# Patient Record
Sex: FEMALE | Race: WHITE | HISPANIC OR LATINO | Employment: PART TIME | URBAN - METROPOLITAN AREA
[De-identification: names, ages, dates, MRNs, and addresses within clinical notes are randomized per-mention and may not be internally consistent; named-entity substitution may affect disease eponyms.]

---

## 2018-09-05 ENCOUNTER — OFFICE VISIT (OUTPATIENT)
Dept: URGENT CARE | Facility: PHYSICIAN GROUP | Age: 18
End: 2018-09-05
Payer: COMMERCIAL

## 2018-09-05 VITALS
OXYGEN SATURATION: 100 % | HEART RATE: 73 BPM | DIASTOLIC BLOOD PRESSURE: 74 MMHG | BODY MASS INDEX: 23.19 KG/M2 | TEMPERATURE: 97.3 F | RESPIRATION RATE: 14 BRPM | WEIGHT: 126 LBS | HEIGHT: 62 IN | SYSTOLIC BLOOD PRESSURE: 108 MMHG

## 2018-09-05 DIAGNOSIS — R10.13 EPIGASTRIC PAIN: ICD-10-CM

## 2018-09-05 PROCEDURE — 99214 OFFICE O/P EST MOD 30 MIN: CPT | Performed by: PHYSICIAN ASSISTANT

## 2018-09-05 RX ORDER — OMEPRAZOLE 20 MG/1
20 CAPSULE, DELAYED RELEASE ORAL DAILY
Qty: 14 CAP | Refills: 0 | Status: SHIPPED | OUTPATIENT
Start: 2018-09-05 | End: 2018-09-19

## 2018-09-05 RX ORDER — SUCRALFATE 1 G/1
1 TABLET ORAL
Qty: 56 TAB | Refills: 0 | Status: SHIPPED | OUTPATIENT
Start: 2018-09-05 | End: 2018-09-19

## 2018-09-06 ASSESSMENT — ENCOUNTER SYMPTOMS
CONSTIPATION: 0
COUGH: 0
VOMITING: 0
VISUAL CHANGE: 0
FLANK PAIN: 0
EYE DISCHARGE: 0
FATIGUE: 1
FEVER: 0
CHILLS: 0
WHEEZING: 0
BLOOD IN STOOL: 0
HEARTBURN: 1
SORE THROAT: 0
SHORTNESS OF BREATH: 0
ROS GI COMMENTS: 1
NAUSEA: 1
DIARRHEA: 0
EYE REDNESS: 0
ABDOMINAL PAIN: 1

## 2018-09-07 NOTE — PROGRESS NOTES
"Subjective:      Mia Duran is a 18 y.o. female who presents with GI Problem (uppper stomach pain nausea since  sunday after eating )            Patient is a 18-year-old female who presents today with upper abdominal pain with associated nausea for the last days.  Patient reports the pain is most notable after eating.  Patient denies any associated diarrhea, fevers, vomiting only nausea.  Patient currently denies any urinary symptoms, vaginal discharge, rashes or blood in her stool.  Patient denies any prior abdominal surgeries      GI Problem   This is a new problem. The current episode started in the past 7 days. The problem occurs constantly. The problem has been waxing and waning. Associated symptoms include abdominal pain, fatigue and nausea. Pertinent negatives include no chills, congestion, coughing, fever, rash, sore throat, urinary symptoms, visual change or vomiting. The symptoms are aggravated by eating. She has tried nothing for the symptoms.       Review of Systems   Constitutional: Positive for fatigue. Negative for chills, fever and malaise/fatigue.   HENT: Negative for congestion and sore throat.    Eyes: Negative for discharge and redness.   Respiratory: Negative for cough, shortness of breath and wheezing.    Gastrointestinal: Positive for abdominal pain, heartburn and nausea. Negative for blood in stool, constipation, diarrhea, melena and vomiting.        1   Genitourinary: Negative for dysuria, flank pain, frequency, hematuria and urgency.   Skin: Negative for itching and rash.   All other systems reviewed and are negative.         Objective:     /74   Pulse 73   Temp 36.3 °C (97.3 °F)   Resp 14   Ht 1.575 m (5' 2\")   Wt 57.2 kg (126 lb)   SpO2 100%   BMI 23.05 kg/m²    Last normal menstrual cycle was 2 weeks ago  Physical Exam   Constitutional: She is oriented to person, place, and time. She appears well-developed and well-nourished. No distress.   HENT:   Head: Normocephalic " and atraumatic.   Right Ear: External ear normal.   Left Ear: External ear normal.   Mouth/Throat: Oropharynx is clear and moist. No oropharyngeal exudate.   Eyes: Pupils are equal, round, and reactive to light. Conjunctivae and EOM are normal.   Neck: Normal range of motion. Neck supple. No tracheal deviation present.   Cardiovascular: Normal rate and regular rhythm.    No murmur heard.  Pulmonary/Chest: Effort normal and breath sounds normal. No respiratory distress.   Abdominal: Soft. Bowel sounds are normal. There is tenderness in the epigastric area.       Patient with notable tenderness to the epigastrium, without specific Zhao sign, and specifically negative McBurney's.   Musculoskeletal: Normal range of motion. She exhibits no edema.   Neurological: She is alert and oriented to person, place, and time. Coordination normal.   Skin: Skin is warm. No rash noted.   Psychiatric: She has a normal mood and affect. Her behavior is normal. Judgment and thought content normal.   Vitals reviewed.              Assessment/Plan:     1. Epigastric pain  - hyoscyamine-maalox plus-lidocaine viscous (GI COCKTAIL); Take 30 mL by mouth Once for 1 dose.  Dispense: 30 mL; Refill: 0  - sucralfate (CARAFATE) 1 GM Tab; Take 1 Tab by mouth 4 Times a Day,Before Meals and at Bedtime for 14 days.  Dispense: 56 Tab; Refill: 0  - omeprazole (PRILOSEC) 20 MG delayed-release capsule; Take 1 Cap by mouth every day for 14 days.  Dispense: 14 Cap; Refill: 0    Recheck after GI cocktail of which the patient had complete resolvent of her symptoms.  Patient declined further referrals, imaging and/or labs at this time as she feels great after the cocktail.  I will treat for suspected gastritis-ulceration.  Strongly encouraged the patient to have follow-up with her PCP as patient may need further labs to rule out H pylori and or ultrasound to ensure patient is not with gallbladder issues.  Diet changes discussed.   Patient given precautionary  s/sx that mandate immediate follow up and evaluation in the ED. Advised of risks of not doing so.    DDX, Supportive care, and indications for immediate follow-up discussed with patient.    Instructed to return to clinic or nearest emergency department if we are not available for any change in condition, further concerns, or worsening of symptoms.    The patient demonstrated a good understanding and agreed with the treatment plan.    Please note that this dictation was created using voice recognition software. I have made every reasonable attempt to correct obvious errors, but I expect that there are errors of grammar and possibly content that I did not discover before finalizing the note.

## 2018-10-29 ENCOUNTER — OFFICE VISIT (OUTPATIENT)
Dept: URGENT CARE | Facility: PHYSICIAN GROUP | Age: 18
End: 2018-10-29

## 2018-10-29 VITALS
TEMPERATURE: 98.1 F | HEIGHT: 62 IN | RESPIRATION RATE: 16 BRPM | BODY MASS INDEX: 24.29 KG/M2 | DIASTOLIC BLOOD PRESSURE: 56 MMHG | SYSTOLIC BLOOD PRESSURE: 104 MMHG | HEART RATE: 84 BPM | WEIGHT: 132 LBS | OXYGEN SATURATION: 99 %

## 2018-10-29 DIAGNOSIS — S39.012A STRAIN OF LUMBAR REGION, INITIAL ENCOUNTER: ICD-10-CM

## 2018-10-29 DIAGNOSIS — S86.811A STRAIN OF CALF MUSCLE, RIGHT, INITIAL ENCOUNTER: ICD-10-CM

## 2018-10-29 PROCEDURE — 99214 OFFICE O/P EST MOD 30 MIN: CPT | Performed by: FAMILY MEDICINE

## 2018-10-29 RX ORDER — IBUPROFEN 200 MG
200 TABLET ORAL EVERY 6 HOURS PRN
COMMUNITY
End: 2019-10-04

## 2018-10-29 RX ORDER — CYCLOBENZAPRINE HCL 5 MG
5-10 TABLET ORAL 3 TIMES DAILY PRN
Qty: 30 TAB | Refills: 0 | Status: SHIPPED | OUTPATIENT
Start: 2018-10-29 | End: 2019-10-04

## 2018-10-29 NOTE — LETTER
October 29, 2018         Patient: Mia Duran   YOB: 2000   Date of Visit: 10/29/2018           To Whom it May Concern:    Mia Duran was seen in my clinic on 10/29/2018. She may return to work on Thursday..    If you have any questions or concerns, please don't hesitate to call.        Sincerely,           Sebastian Ponce M.D.  Electronically Signed

## 2018-10-30 NOTE — PROGRESS NOTES
"  Chief Complaint   Patient presents with   • Leg Pain     onset friday after workout, lower back pain             Back Pain  This is a new problem. The current episode started in the past 2 days after doing \"squats\" at the gym. The problem occurs constantly. The problem is unchanged. The pain is present in the lumbar spine. The quality of the pain is described as aching.   She also has some pain in rt calf muscle. The pain does not radiate. The pain is moderate. The symptoms are aggravated by position.   States it is painful to walk, especially striking with the rt foot.       Pertinent negatives include no bladder incontinence, bowel incontinence, dysuria, fever, headaches, numbness or weakness. Treatments tried: motrin.  The treatment provided no relief.     Social History   Substance Use Topics   • Smoking status: Never Smoker   • Smokeless tobacco: Never Used   • Alcohol use No       Family hx was reviewed - no pertinent past family hx      Past medical history was unremarkable and not pertinent to current issue    Review of Systems   Constitutional: Negative for fever.   Gastrointestinal: Negative for bowel incontinence.   Genitourinary: Negative for bladder incontinence, dysuria, urgency and frequency.   Musculoskeletal: Positive for back pain.   Neurological: Negative for weakness, numbness and headaches.   All other systems reviewed and are negative.         Objective:     Blood pressure 104/56, pulse 84, temperature 36.7 °C (98.1 °F), temperature source Temporal, resp. rate 16, height 1.575 m (5' 2\"), weight 59.9 kg (132 lb), SpO2 99 %.    Physical Exam   Constitutional: pt is oriented to person, place, and time. Pt appears well-developed and well-nourished. No distress.   HENT:   Head: Normocephalic and atraumatic.   Eyes: EOM are normal. Pupils are equal, round, and reactive to light. No scleral icterus.   Neck: Normal range of motion. Neck supple. No thyromegaly present.   Cardiovascular: Normal rate, " regular rhythm and normal heart sounds.    Pulmonary/Chest: Effort normal and breath sounds normal. No respiratory distress.  no wheezes.   no rales.  no tenderness.   Musculoskeletal: pt exhibits no edema.        Right knee: Normal.        Left knee: Normal.               Lumbar back: pt exhibits decreased range of motion, spasm and tenderness . Pt exhibits no bony tenderness, no swelling, no edema, no deformity  .   Neurological: patient is alert and oriented to person, place, and time. Patient has normal reflexes. No cranial nerve deficit. Patient exhibits normal muscle tone.      STRAIGHT LEG RAISE, BRANT NEGATIVE BILAT      Rt calf - no edemea.   +TTP over belly of calf muscle.   phuong's sx neg  Skin: Skin is warm and dry. No rash noted. No erythema.   Psychiatric: patient has a normal mood and affect.  behavior is normal.   Nursing note and vitals reviewed.              Assessment/Plan:       1. Strain of lumbar region, initial encounter     - cyclobenzaprine (FLEXERIL) 5 MG tablet; Take 1-2 Tabs by mouth 3 times a day as needed.  Dispense: 30 Tab; Refill: 0  - Diclofenac Sodium (VOLTAREN) 1 % Gel; Apply 4 g to skin as directed 3 times a day as needed.  Dispense: 1 Tube; Refill: 0    2. Strain of calf muscle, right, initial encounter     - Diclofenac Sodium (VOLTAREN) 1 % Gel; Apply 4 g to skin as directed 3 times a day as needed.  Dispense: 1 Tube; Refill: 0       Crutches      Follow up in one week if no improvement, sooner if symptoms worsen.

## 2019-05-03 ENCOUNTER — NON-PROVIDER VISIT (OUTPATIENT)
Dept: URGENT CARE | Facility: PHYSICIAN GROUP | Age: 19
End: 2019-05-03

## 2019-05-03 DIAGNOSIS — Z11.1 SCREENING FOR TUBERCULOSIS: ICD-10-CM

## 2019-05-03 PROCEDURE — 86580 TB INTRADERMAL TEST: CPT | Performed by: PHYSICIAN ASSISTANT

## 2019-05-03 NOTE — PROGRESS NOTES
Mia Duran is a 19 y.o. female here for a non-provider visit for PPD placement -- Step 1 of 2    Reason for PPD:  school requirement    1. TB evaluation questionnaire completed by patient? Yes      -  If any answers marked yes did you contact a provider prior to placing? Not Indicated  2.  Patient notified to return to clinic for reading on:   5/5/2019 after 10:37a  5/6/2019 before 10:37a  3.  PPD Placement documentation completed on TB evaluation questionnaire? Yes  4.  Location of TB evaluation questionnaire filed: media

## 2019-05-05 ENCOUNTER — NON-PROVIDER VISIT (OUTPATIENT)
Dept: URGENT CARE | Facility: PHYSICIAN GROUP | Age: 19
End: 2019-05-05

## 2019-05-05 LAB — TB WHEAL 3D P 5 TU DIAM: NORMAL MM

## 2019-05-05 NOTE — PROGRESS NOTES
Mia Duran is a 19 y.o. female here for a non-provider visit for PPD reading -- Step 1 of 2.      1.  Resulted in Epic under enter/edit results? Yes   2.  TB evaluation questionnaire scanned into chart and original given to patient?Yes      3. Was induration greater than 0 mm? No.    If Step 1 of 2, when is patient returning for second step (delete if N/A):      Routed to PCP     (2) potential problem

## 2019-05-10 ENCOUNTER — NON-PROVIDER VISIT (OUTPATIENT)
Dept: URGENT CARE | Facility: PHYSICIAN GROUP | Age: 19
End: 2019-05-10

## 2019-05-10 DIAGNOSIS — Z11.1 ENCOUNTER FOR PPD TEST: Primary | ICD-10-CM

## 2019-05-10 NOTE — PROGRESS NOTES
Mia Duran is a 19 y.o. female here for a non-provider visit for PPD placement -- Step 2 of 2    Reason for PPD:  school requirement    1. TB evaluation questionnaire completed by patient? Yes      -  If any answers marked yes did you contact a provider prior to placing? Not Indicated  2.  Patient notified to return to clinic for reading on: 5/12 or 5/13  3.  PPD Placement documentation completed on TB evaluation questionnaire? Yes  4.  Location of TB evaluation questionnaire filed:

## 2019-05-13 ENCOUNTER — NON-PROVIDER VISIT (OUTPATIENT)
Dept: URGENT CARE | Facility: PHYSICIAN GROUP | Age: 19
End: 2019-05-13

## 2019-05-13 LAB — TB WHEAL 3D P 5 TU DIAM: 0 MM

## 2019-05-13 PROCEDURE — 86580 TB INTRADERMAL TEST: CPT | Performed by: FAMILY MEDICINE

## 2019-05-13 NOTE — PROGRESS NOTES
Mia Duran is a 19 y.o. female here for a non-provider visit for PPD reading -- Step 2 of 2.      1.  Resulted in Epic under enter/edit results? Yes   2.  TB evaluation questionnaire scanned into chart and original given to patient?Yes      3. Was induration greater than 0 mm? No.    Routed to PCP? No

## 2019-06-03 ENCOUNTER — GYNECOLOGY VISIT (OUTPATIENT)
Dept: OBGYN | Facility: CLINIC | Age: 19
End: 2019-06-03

## 2019-06-03 VITALS — SYSTOLIC BLOOD PRESSURE: 100 MMHG | DIASTOLIC BLOOD PRESSURE: 60 MMHG | WEIGHT: 133 LBS | BODY MASS INDEX: 24.33 KG/M2

## 2019-06-03 DIAGNOSIS — N93.8 DYSFUNCTIONAL UTERINE BLEEDING: ICD-10-CM

## 2019-06-03 DIAGNOSIS — Z34.00 SUPERVISION OF NORMAL FIRST PREGNANCY, ANTEPARTUM: ICD-10-CM

## 2019-06-03 LAB
INT CON NEG: NEGATIVE
INT CON POS: POSITIVE
POC URINE PREGNANCY TEST: POSITIVE

## 2019-06-03 PROCEDURE — 81025 URINE PREGNANCY TEST: CPT | Performed by: NURSE PRACTITIONER

## 2019-06-03 PROCEDURE — 99213 OFFICE O/P EST LOW 20 MIN: CPT | Performed by: NURSE PRACTITIONER

## 2019-06-03 NOTE — NON-PROVIDER
Pt here for DUB.    Pt states has been having cramping and nausea   Good# [phone]  LMP: 4/24/19

## 2019-06-03 NOTE — PROGRESS NOTES
GYN DUB visit today    S) patient is a 19 yoa nulligravid with missed menses. States sure LMP of 4/24. Has had positive home UPT. States slight abdominal cramping, no bleeding. No dysuria. States some nausea but no vomit. Has already started a prenatal vitamin. Questioning of FOB and patient reveals no significant medical or genetic history. On maternal side, grandparents with hypertension. On paternal side, grandfather with prostate cancer. Both are happy with pregnancy.   O) positive UPT today  A) IUP at 5 5/7 week gestation based on sure LMP with tentative/initial rafael of 1/29/20.   Patient will make appt in 4-5 weeks for new OB. Will make financial appt as she has no insurance coverage. All questions answered. SAB precautions s/s reviewed. Continue PNV. Anticipate labs and physical at new OB appt.     Patient was seen for 15 minutes face to face of which > 50% of appointment time was spent on counseling and coordination of care regarding the above.

## 2019-07-19 ENCOUNTER — TELEPHONE (OUTPATIENT)
Dept: OBGYN | Facility: CLINIC | Age: 19
End: 2019-07-19

## 2019-07-19 ENCOUNTER — HOSPITAL ENCOUNTER (OUTPATIENT)
Facility: MEDICAL CENTER | Age: 19
End: 2019-07-19
Attending: NURSE PRACTITIONER
Payer: COMMERCIAL

## 2019-07-19 ENCOUNTER — INITIAL PRENATAL (OUTPATIENT)
Dept: OBGYN | Facility: CLINIC | Age: 19
End: 2019-07-19

## 2019-07-19 ENCOUNTER — HOSPITAL ENCOUNTER (OUTPATIENT)
Dept: LAB | Facility: MEDICAL CENTER | Age: 19
End: 2019-07-19
Attending: NURSE PRACTITIONER
Payer: COMMERCIAL

## 2019-07-19 VITALS
BODY MASS INDEX: 24.48 KG/M2 | DIASTOLIC BLOOD PRESSURE: 70 MMHG | HEIGHT: 62 IN | SYSTOLIC BLOOD PRESSURE: 104 MMHG | WEIGHT: 133 LBS

## 2019-07-19 DIAGNOSIS — Z34.02 ENCOUNTER FOR SUPERVISION OF NORMAL FIRST PREGNANCY IN SECOND TRIMESTER: ICD-10-CM

## 2019-07-19 DIAGNOSIS — Z34.02 ENCOUNTER FOR SUPERVISION OF NORMAL FIRST PREGNANCY IN SECOND TRIMESTER: Primary | ICD-10-CM

## 2019-07-19 DIAGNOSIS — Z34.91 PRENATAL CARE IN FIRST TRIMESTER: ICD-10-CM

## 2019-07-19 PROBLEM — Z34.92 ENCOUNTER FOR SUPERVISION OF NORMAL PREGNANCY IN SECOND TRIMESTER: Status: ACTIVE | Noted: 2019-07-19

## 2019-07-19 LAB
ABO GROUP BLD: NORMAL
APPEARANCE UR: NORMAL
BILIRUB UR STRIP-MCNC: NORMAL MG/DL
BLD GP AB SCN SERPL QL: NORMAL
COLOR UR AUTO: NORMAL
GLUCOSE UR STRIP.AUTO-MCNC: NEGATIVE MG/DL
KETONES UR STRIP.AUTO-MCNC: NEGATIVE MG/DL
LEUKOCYTE ESTERASE UR QL STRIP.AUTO: NEGATIVE
NITRITE UR QL STRIP.AUTO: NEGATIVE
PH UR STRIP.AUTO: 7.5 [PH] (ref 5–8)
PROT UR QL STRIP: NEGATIVE MG/DL
RBC UR QL AUTO: NORMAL
RH BLD: NORMAL
SP GR UR STRIP.AUTO: 1.01
UROBILINOGEN UR STRIP-MCNC: NORMAL MG/DL

## 2019-07-19 PROCEDURE — 81002 URINALYSIS NONAUTO W/O SCOPE: CPT | Performed by: NURSE PRACTITIONER

## 2019-07-19 PROCEDURE — 0500F INITIAL PRENATAL CARE VISIT: CPT | Performed by: NURSE PRACTITIONER

## 2019-07-19 ASSESSMENT — ENCOUNTER SYMPTOMS
RESPIRATORY NEGATIVE: 1
PSYCHIATRIC NEGATIVE: 1
GASTROINTESTINAL NEGATIVE: 1
CONSTITUTIONAL NEGATIVE: 1
MUSCULOSKELETAL NEGATIVE: 1
EYES NEGATIVE: 1
NEUROLOGICAL NEGATIVE: 1
CARDIOVASCULAR NEGATIVE: 1

## 2019-07-19 NOTE — PROGRESS NOTES
NOB today  LMP 4/24/19  PNV currently taking   Last pap: none   Phone # [phone]  Pharmacy confirmed  Pt states no concerns today

## 2019-07-19 NOTE — TELEPHONE ENCOUNTER
Pt called stating she had her New Ob appt today but did not get an US as she was told on the previous appt. Adv pt her dates are correct and  usually when unsure of LMP or dates are off. Pt requesting to do , notified it would be $120.00 and pt agrees and would like to schedule one. Call transferred to RadhaPHUC to schedule

## 2019-07-19 NOTE — PROGRESS NOTES
"Subjective:      S:  Mia Beard is a 19 y.o.  female  @ EGA: 12w2d TERRIE: Estimated Date of Delivery: 20  per LMP who presents for her new OB exam.  She has no complaints.  Desires AFP.  Declines CF.  Reports no FM, VB, LOF, or cramping.  Denies dysuria, vaginal DC.  Pt is  and lives with FOB (Lionel). Works as  in [employer], no heavy lifting.  Pregnancy is unplanned but desired.  Desires Centering Pregnancy.    O:    Vitals:    19 0937   BP: 104/70   Weight: 60.3 kg (133 lb)   Height: 1.575 m (5' 2\")    See H&P Prenatal Physical.  Wet mount: not indicated        FHTs: 160        Fundal ht: 12cm     A:   1.  IUP @ 12w2d TERRIE: Estimated Date of Delivery: 20 per LMP         2.  S=D        3.    Patient Active Problem List    Diagnosis Date Noted   • Encounter for supervision of normal pregnancy in second trimester 2019   • Supervision of normal first pregnancy, antepartum 2019   • Dysfunctional uterine bleeding 2019         P:  1.  GC/CT fdone. Pap deferred due to age.         2.  Prenatal labs ordered - lab slip given        3.  Discussed PNV, diet, and adequate water intake        4.  NOB packet given        5.  Return to office in 4 wks        6.  Complete OB US in 7-8 wks        7.  Centering Pregnancy    HPI    Review of Systems   Constitutional: Negative.    HENT: Negative.    Eyes: Negative.    Respiratory: Negative.    Cardiovascular: Negative.    Gastrointestinal: Negative.    Genitourinary: Negative.         Uterus enlarged, c/w 12 wk ga   Musculoskeletal: Negative.    Skin: Negative.    Neurological: Negative.    Endo/Heme/Allergies: Negative.    Psychiatric/Behavioral: Negative.           Objective:     /70   Ht 1.575 m (5' 2\")   Wt 60.3 kg (133 lb)   LMP 2019 (Exact Date)   BMI 24.33 kg/m²      Physical Exam            Assessment/Plan:     1. Encounter for supervision of normal first pregnancy in second " trimester    - PREG CNTR PRENATAL PN; Future  - US-OB 2ND 3RD TRI COMPLETE; Future  - CHLAMYDIA/GC PCR URINE OR SWAB; Future    2. Prenatal care in first trimester    - POCT Urinalysis

## 2019-07-19 NOTE — LETTER
Cystic Fibrosis Carrier Testing  Mia Beard    The following information is about a blood test that can be done to determine if you and/or your partner carry the gene for cystic fibrosis.    WHAT IS CYSTIC FIBROSIS?  · Cystic fibrosis (CF) is an inherited disease that affects more than 25,000 [place] children and young adults.  · Symptoms of CF vary but include lung congestion, pneumonia, diarrhea and poor growth.  Most people with CF have severe medical problems and some die at a young age.  Others have so few symptoms they are unaware they have CF.  · CF does not affect intelligence.  · Although there is no cure for CF at this time, scientists are making progress in improving treatment and in searching for a cure.  In the past many people with CF  at a very young age.  Today, many are living into their 20’s and 30’s.    IS THERE A CHANCE MY BABY COULD HAVE CYSTIC FIBROSIS?  · You can have a child with CF even if there is no history in your family (see chart below).  · CF testing can help determine if you are a carrier and at risk to have a child with CF.  Note: if both parents are carriers, there is a 1 in 4 (25%) chance with each pregnancy that they will have a child with CF.  · Carriers have one normal CF gene and one altered CF gene.  · People with CF have two altered CF genes.  · Most people have two normal copies of the CF gene.    Approximate risk that a couple with no family history of cystic fibrosis will have a child with cystic fibrosis:    Ethnic background / Risk     couple:  1 in 2,500   couple:  1 in 15,000            couple:  1 in 8,000     American couple:  1 in 32,000     WHAT TESTING IS AVAILABLE?  · There is a blood test that can be done to find out if you or your partner is a carrier.  · It is important to understand that CF carrier testing does not detect all CF carriers.  · If the test shows that you are both CF carriers, you unborn baby  can be tested to find out if the baby has CF.    HOW MUCH DOES IT COST TO HAVE CYSTIC FIBROSIS CARRIER TESTING?  · Cost and insurance coverage for CF carrier testing vary depending upon the laboratory used and your insurance policy.  · The average cost for CF carrier testing is $300 per person.  · Your genetic counselor can provide you with more information about cystic fibrosis carrier testing.    _____  Yes, I am interested in discussing carrier testing with a genetic counselor.    _____  No, I am not interested in CF carrier testing or in receiving more information about CF carrier testing.      Client signature: ________________________________________  7/19/2019

## 2019-07-20 LAB
APPEARANCE UR: CLEAR
BASOPHILS # BLD AUTO: 0.7 % (ref 0–1.8)
BASOPHILS # BLD: 0.07 K/UL (ref 0–0.12)
BILIRUB UR QL STRIP.AUTO: NEGATIVE
C TRACH DNA SPEC QL NAA+PROBE: NEGATIVE
COLOR UR: YELLOW
EOSINOPHIL # BLD AUTO: 0.02 K/UL (ref 0–0.51)
EOSINOPHIL NFR BLD: 0.2 % (ref 0–6.9)
ERYTHROCYTE [DISTWIDTH] IN BLOOD BY AUTOMATED COUNT: 45 FL (ref 35.9–50)
GLUCOSE UR STRIP.AUTO-MCNC: NEGATIVE MG/DL
HBV SURFACE AG SER QL: NEGATIVE
HCT VFR BLD AUTO: 44.3 % (ref 37–47)
HGB BLD-MCNC: 14.1 G/DL (ref 12–16)
HIV 1+2 AB+HIV1 P24 AG SERPL QL IA: NON REACTIVE
IMM GRANULOCYTES # BLD AUTO: 0.03 K/UL (ref 0–0.11)
IMM GRANULOCYTES NFR BLD AUTO: 0.3 % (ref 0–0.9)
KETONES UR STRIP.AUTO-MCNC: NEGATIVE MG/DL
LEUKOCYTE ESTERASE UR QL STRIP.AUTO: NEGATIVE
LYMPHOCYTES # BLD AUTO: 2.09 K/UL (ref 1–4.8)
LYMPHOCYTES NFR BLD: 20 % (ref 22–41)
MCH RBC QN AUTO: 30.4 PG (ref 27–33)
MCHC RBC AUTO-ENTMCNC: 31.8 G/DL (ref 33.6–35)
MCV RBC AUTO: 95.5 FL (ref 81.4–97.8)
MICRO URNS: ABNORMAL
MONOCYTES # BLD AUTO: 0.8 K/UL (ref 0–0.85)
MONOCYTES NFR BLD AUTO: 7.7 % (ref 0–13.4)
N GONORRHOEA DNA SPEC QL NAA+PROBE: NEGATIVE
NEUTROPHILS # BLD AUTO: 7.42 K/UL (ref 2–7.15)
NEUTROPHILS NFR BLD: 71.1 % (ref 44–72)
NITRITE UR QL STRIP.AUTO: NEGATIVE
NRBC # BLD AUTO: 0 K/UL
NRBC BLD-RTO: 0 /100 WBC
PH UR STRIP.AUTO: 7.5 [PH]
PLATELET # BLD AUTO: 238 K/UL (ref 164–446)
PMV BLD AUTO: 10.7 FL (ref 9–12.9)
PROT UR QL STRIP: NEGATIVE MG/DL
RBC # BLD AUTO: 4.64 M/UL (ref 4.2–5.4)
RBC UR QL AUTO: NEGATIVE
RUBV AB SER QL: 49.5 IU/ML
SP GR UR STRIP.AUTO: 1.01
SPECIMEN SOURCE: NORMAL
TREPONEMA PALLIDUM IGG+IGM AB [PRESENCE] IN SERUM OR PLASMA BY IMMUNOASSAY: NON REACTIVE
UROBILINOGEN UR STRIP.AUTO-MCNC: 0.2 MG/DL
WBC # BLD AUTO: 10.4 K/UL (ref 4.8–10.8)

## 2019-09-05 ENCOUNTER — ROUTINE PRENATAL (OUTPATIENT)
Dept: OBGYN | Facility: CLINIC | Age: 19
End: 2019-09-05
Payer: COMMERCIAL

## 2019-09-05 VITALS — WEIGHT: 136 LBS | BODY MASS INDEX: 24.87 KG/M2 | SYSTOLIC BLOOD PRESSURE: 92 MMHG | DIASTOLIC BLOOD PRESSURE: 60 MMHG

## 2019-09-05 DIAGNOSIS — Z34.00 SUPERVISION OF NORMAL FIRST PREGNANCY, ANTEPARTUM: Primary | ICD-10-CM

## 2019-09-05 PROCEDURE — 90040 PR PRENATAL FOLLOW UP: CPT | Performed by: NURSE PRACTITIONER

## 2019-09-05 NOTE — PROGRESS NOTES
Pt here today for OB follow up  Pt states she had some spotting last week  Reports +FM  Good # [phone]  Pharmacy Confirmed.  Chaperone offered and declined.   Declined AFP  U/S on 9/23

## 2019-09-05 NOTE — PROGRESS NOTES
S) Pt is a 19 y.o.   at 19w1d  gestation. Routine prenatal care today. No complaints today. Has US scheduled for 19. Labs reviewed.  labor precautions discussed, all questions answered.    Fetal movement Normal  Cramping no  VB no  LOF no   Denies dysuria. Generally feels well today. Good self-care activities identified. Denies headaches, swelling, visual changes, or epigastric pain .     O) BP (!) 92/60   Wt 61.7 kg (136 lb)         Labs:       PNL: WNL       GCT: Too early        AFP: Not Examined       GBS: N/A       Pertinent ultrasound -        Scheduled for 19    A) IUP at 19w1d       S=D         Patient Active Problem List    Diagnosis Date Noted   • Encounter for supervision of normal pregnancy in second trimester 2019   • Supervision of normal first pregnancy, antepartum 2019   • Dysfunctional uterine bleeding 2019          SVE: deferred       Chaperone offered: n/a         TDAP: no       FLU: no        BTL: no       : n/a       C/S Consent: n/a       IOL or C/S scheduled: no       LAST PAP: 19- negative         P) s/s ptl vs general discomforts. Fetal movements reviewed. General ed and anticipatory guidance. Nutrition/exercise/vitamin. Plans breast Plans pp contraception- unsure  Continue PNV.

## 2019-09-23 ENCOUNTER — APPOINTMENT (OUTPATIENT)
Dept: RADIOLOGY | Facility: IMAGING CENTER | Age: 19
End: 2019-09-23
Attending: NURSE PRACTITIONER
Payer: COMMERCIAL

## 2019-09-23 DIAGNOSIS — Z34.02 ENCOUNTER FOR SUPERVISION OF NORMAL FIRST PREGNANCY IN SECOND TRIMESTER: ICD-10-CM

## 2019-09-23 PROBLEM — Z36.89: Status: ACTIVE | Noted: 2019-09-23

## 2019-09-23 PROCEDURE — 76805 OB US >/= 14 WKS SNGL FETUS: CPT | Performed by: OBSTETRICS & GYNECOLOGY

## 2019-10-04 ENCOUNTER — ROUTINE PRENATAL (OUTPATIENT)
Dept: OBGYN | Facility: CLINIC | Age: 19
End: 2019-10-04
Payer: COMMERCIAL

## 2019-10-04 VITALS — WEIGHT: 145 LBS | BODY MASS INDEX: 26.52 KG/M2 | SYSTOLIC BLOOD PRESSURE: 102 MMHG | DIASTOLIC BLOOD PRESSURE: 68 MMHG

## 2019-10-04 DIAGNOSIS — Z34.00 SUPERVISION OF NORMAL FIRST PREGNANCY, ANTEPARTUM: Primary | ICD-10-CM

## 2019-10-04 PROBLEM — N93.8 DYSFUNCTIONAL UTERINE BLEEDING: Status: RESOLVED | Noted: 2019-06-03 | Resolved: 2019-10-04

## 2019-10-04 PROCEDURE — 90040 PR PRENATAL FOLLOW UP: CPT | Performed by: NURSE PRACTITIONER

## 2019-10-04 NOTE — PROGRESS NOTES
S:  Pt is  at 23w2d here for routine OB follow up.  Reports that she had an instance of feeling dizzy while at work right after eating lunch. Had breakfast @ 8 and lunch at noon.  Has been drinking a good amount of water.  Also reports feeling her heart beat faster when laying in bed at night. Reports good FM.  Denies VB, LOF, RUCs, or vaginal DC.     O:  Please see above vitals.        FHTs: 140        Fundal ht: 23 cm.        AFP: declined    Complete OB US  2019 12:26 PM    HISTORY/REASON FOR EXAM:  Evaluate fetal anatomy    TECHNIQUE/EXAM DESCRIPTION: OB complete ultrasound.    COMPARISON:  None    FINDINGS:  Fetal Lie:  Vertex  LMP:  2019  Clinical TERRIE by LMP:  2020    Placenta (Location):  Posterior right  Placenta Previa: No  Placental Grade: I    Amniotic Fluid Volume:  DALLAS = 16.85 cm    Fetal Heart Rate:  146 bpm    Cervical Length:  3.76 cm transabdominal    No maternal adnexal mass is identified.    Umbilical Artery S/D Ratio(s):  Not applicable    Fetal Anatomy  (Seen or Not Seen)  Lateral Ventricles     Seen  Cisterna Magna        Seen  Cerebellum              Seen  CSP             Seen  Orbits             Seen  Face/Lips                Seen  Cord Insertion         Seen  Placental CI         Seen  4 Chamber Heart     Seen  LVOT               Seen  RVOT              Seen  3 Vessel View     Seen  Stomach       Seen  Kidneys                   Seen  Urinary Bladder      Seen  Spine                       Seen  3 Vessel Cord          Seen  Both Upper Extremities    Seen  Both Lower Extremities    Seen  Diaphragm             Seen  Movement       Seen  Gender:  Likely female    Fetal Biometry  BPD    5.03 cm, 21 weeks, 2 days  HC    18.54 cm, 20 weeks, 6 days  AC    15.99 cm, 21 weeks, 1 day  Femur Length    3.37 cm, 20 weeks, 4 days  Humerus Length    3.45 cm, 21 weeks, 6 days  Cerebellum Diameter   2.20 cm    EGA by this US:  21 weeks  TERRIE by this US: 2/3/2020  TERRIE by 1st US:  Not  applicable    Estimated Fetal Weight:  383 grams  EFW Percentile: 11.3%        Impression       Single intrauterine pregnancy of an estimated gestational age of 21 weeks with an estimated date of delivery of 2/3/2020.    Fetal survey within normal limits.         A:  IUP 23w2d  Patient Active Problem List    Diagnosis Date Noted   • Fetal US with growth at 11%, consider growth if uterine discrepancy 09/23/2019   • Encounter for supervision of normal pregnancy in second trimester 07/19/2019   • Supervision of normal first pregnancy, antepartum 06/03/2019        P:  1.  Reviewed labs, ultrasound w pt.          2.  Questions answered.          3.  Encouraged adequate water intake        4.  F/u 4 wks.        5.  Flu vaccine declined.        6.  28wk labs ordered.         7.  D/w pt helps for dizziness, rec'd small frequent meals every 2-3hrs with protein.        8.  D/w pt safe sleep practices during pregnancy.

## 2019-10-04 NOTE — PROGRESS NOTES
OB follow up   + fetal movement.  No VB, LOF or UC's.  Wt: 145lb      BP:102/68  Phone # [phone]  Preferred pharmacy confirmed.  Flu vaccine declined.  3rd trimester lab orders pended and instructions given to patient

## 2019-10-04 NOTE — PATIENT INSTRUCTIONS
P:  1.  Reviewed labs, ultrasound w pt.          2.  Questions answered.          3.  Encouraged adequate water intake        4.  F/u 4 wks.        5.  Flu vaccine declined.        6.  28wk labs ordered.         7.  D/w pt helps for dizziness, rec'd small frequent meals every 2-3hrs with protein.        8.  D/w pt safe sleep practices during pregnancy.

## 2019-10-23 ENCOUNTER — HOSPITAL ENCOUNTER (OUTPATIENT)
Dept: LAB | Facility: MEDICAL CENTER | Age: 19
End: 2019-10-23
Attending: NURSE PRACTITIONER
Payer: COMMERCIAL

## 2019-10-23 DIAGNOSIS — Z34.00 SUPERVISION OF NORMAL FIRST PREGNANCY, ANTEPARTUM: ICD-10-CM

## 2019-10-23 LAB
GLUCOSE 1H P 50 G GLC PO SERPL-MCNC: 95 MG/DL (ref 70–139)
HCT VFR BLD AUTO: 38.9 % (ref 37–47)
HGB BLD-MCNC: 12.7 G/DL (ref 12–16)
TREPONEMA PALLIDUM IGG+IGM AB [PRESENCE] IN SERUM OR PLASMA BY IMMUNOASSAY: NON REACTIVE

## 2019-10-23 PROCEDURE — 85018 HEMOGLOBIN: CPT

## 2019-10-23 PROCEDURE — 86780 TREPONEMA PALLIDUM: CPT

## 2019-10-23 PROCEDURE — 85014 HEMATOCRIT: CPT

## 2019-10-23 PROCEDURE — 36415 COLL VENOUS BLD VENIPUNCTURE: CPT

## 2019-10-23 PROCEDURE — 82950 GLUCOSE TEST: CPT

## 2019-11-01 ENCOUNTER — ROUTINE PRENATAL (OUTPATIENT)
Dept: OBGYN | Facility: CLINIC | Age: 19
End: 2019-11-01
Payer: MEDICAID

## 2019-11-01 VITALS — BODY MASS INDEX: 27.8 KG/M2 | WEIGHT: 152 LBS | SYSTOLIC BLOOD PRESSURE: 110 MMHG | DIASTOLIC BLOOD PRESSURE: 70 MMHG

## 2019-11-01 DIAGNOSIS — Z34.03 SUPERVISION OF NORMAL FIRST PREGNANCY IN THIRD TRIMESTER: Primary | ICD-10-CM

## 2019-11-01 PROCEDURE — 90040 PR PRENATAL FOLLOW UP: CPT | Performed by: NURSE PRACTITIONER

## 2019-11-01 PROCEDURE — 90471 IMMUNIZATION ADMIN: CPT | Performed by: NURSE PRACTITIONER

## 2019-11-01 PROCEDURE — 90715 TDAP VACCINE 7 YRS/> IM: CPT | Performed by: NURSE PRACTITIONER

## 2019-11-01 NOTE — LETTER
"Count Your Baby's Movements  Another step to a healthy delivery    Mia Duran             Dept: [phone]    How Many Weeks Pregnant 27w2d    Date to Begin Countin2019              How to use this chart    One way for your physician to keep track of your baby's health is by knowing how often the baby moves (or \"kicks\") in your womb.  You can help your physician to do this by using this chart every day.    Every day, you should see how many hours it takes for your baby to move 10 times.  Start in the morning, as soon as you get up.    · First, write down the time your baby moves until you get to 10.  · Check off one box every time your baby moves until you get to 10.  · Write down the time you finished counting in the last column.  · Total how long it took to count up all 10 movements.  · Finally, fill in the box that shows how long this took.  After counting 10 movements, you no longer have to count any more that day.  The next morning, just start counting again as soon as you get up.    What should you call a \"movement\"?  It is hard to say, because it will feel different from one mother to another and from one pregnancy to the next.  The important thing is that you count the movements the same way throughout your pregnancy.  If you have more questions, you should ask your physician.    Count carefully every day!  SAMPLE:  Week 28    How many hours did it take to feel 10 movements?       Start  Time     1     2     3     4     5     6     7     8     9     10   Finish Time   Mon 8:20 ·  ·  ·  ·  ·  ·  ·  ·  ·  ·  11:40                  Sat               Sun                 IMPORTANT: You should contact your physician if it takes more than two hours for you to feel 10 movements.  Each morning, write down the time and start to count the movements of your baby.  Keep track by checking off one box every time you feel one movement.  When you have " "felt 10 \"kicks\", write down the time you finished counting in the last column.  Then fill in the   box (over the check yamilka) for the number of hours it took.  Be sure to read the complete instructions on the previous page.            "

## 2019-11-01 NOTE — PROGRESS NOTES
S) Pt is a 19 y.o.   at 27w2d  gestation. Routine prenatal care today. Feels like starting to get sick, denies fever or chills.    Fetal movement Normal  Cramping no  VB no  LOF no   Denies dysuria. Generally feels well today. Good self-care activities identified. Denies headaches, swelling, visual changes, or epigastric pain .     O) /70   Wt 68.9 kg (152 lb)         Labs: WNL       PNL: WNL       GCT: 95       AFP: Not Examined       GBS: N/A       Pertinent ultrasound - 19 [place] 16.85, survey WNL, c/w prev dating           A) IUP at 27w2d       S=D         Patient Active Problem List    Diagnosis Date Noted   • Fetal US with growth at 11%, consider growth if uterine discrepancy 2019   • Encounter for supervision of normal pregnancy in second trimester 2019   • Supervision of normal first pregnancy, antepartum 2019          SVE: deferred         TDAP: yes       FLU: no        BTL: no       : no       C/S Consent: no       IOL or C/S scheduled: no       LAST PAP: needs PP         P) s/s ptl vs general discomforts. Fetal movements reviewed. General ed and anticipatory guidance. Nutrition/exercise/vitamin. Plans breast Plans pp contraception- unsure  Continue PNV.   Kick count sheet given  Cold remedy sheet given.   S/p TDAP today  RTC 2 weeks or PRN.

## 2019-11-01 NOTE — PROGRESS NOTES
OB follow up   + fetal movement.  No VB, LOF or UC's.  Wt: 152      BP: 110/70  Phone # [phone]  Preferred pharmacy confirmed.  Kick count sheet given today.  Tdap given today  3rd trimester labs already done.      Tdap vaccine given. Left Deltoid. Screening checklist reviewed with patient. VIS given. Verified by [place].

## 2019-11-15 ENCOUNTER — ROUTINE PRENATAL (OUTPATIENT)
Dept: OBGYN | Facility: CLINIC | Age: 19
End: 2019-11-15
Payer: MEDICAID

## 2019-11-15 VITALS — WEIGHT: 152 LBS | DIASTOLIC BLOOD PRESSURE: 58 MMHG | BODY MASS INDEX: 27.8 KG/M2 | SYSTOLIC BLOOD PRESSURE: 114 MMHG

## 2019-11-15 DIAGNOSIS — Z34.00 SUPERVISION OF NORMAL FIRST PREGNANCY, ANTEPARTUM: Primary | ICD-10-CM

## 2019-11-15 PROCEDURE — 90040 PR PRENATAL FOLLOW UP: CPT | Performed by: NURSE PRACTITIONER

## 2019-11-15 NOTE — PROGRESS NOTES
S:  Pt is  at 29w2d for routine OB follow up.  Reports some leg and back pain especially when standing.  Reports good FM.  Denies VB, LOF, RUCs or vaginal DC.    O:  Please see above vitals.        FHTs: 145        Fundal ht: 28 cm.        1hr GTT: WNL -- reviewed w pt.    A:  IUP at 29w2d  Patient Active Problem List    Diagnosis Date Noted   • Fetal US with growth at 11%, consider growth if uterine discrepancy 2019   • Encounter for supervision of normal pregnancy in second trimester 2019   • Supervision of normal first pregnancy, antepartum 2019        P:  1.  D/w pt helps for low back pain.  Encouraged pregnancy belt use, heat or cold therapy, gentle stretching and warm bath or shower.          2.  Continue FKCs.          3.  Questions answered.          4.  Encouraged pt to tour L&D.          5.  Encourage adequate water intake.        6.  F/u 2 wks.        7.  Tdap already done.          8.  D/w pt safe travel precautions.

## 2019-11-15 NOTE — PATIENT INSTRUCTIONS
P:  1.  D/w pt helps for low back pain.  Encouraged pregnancy belt use, heat or cold therapy, gentle stretching and warm bath or shower.          2.  Continue FKCs.          3.  Questions answered.          4.  Encouraged pt to tour L&D.          5.  Encourage adequate water intake.        6.  F/u 2 wks.        7.  Tdap already done.          8.  D/w pt safe travel precautions.

## 2019-11-15 NOTE — PROGRESS NOTES
Pt here today for OB follow up  Pt states was having pain in her legs and thighs. Also having lower back pain. Only last week.   Reports +FM  Good # [phone]  Pharmacy Confirmed.

## 2019-12-02 ENCOUNTER — ROUTINE PRENATAL (OUTPATIENT)
Dept: OBGYN | Facility: CLINIC | Age: 19
End: 2019-12-02
Payer: MEDICAID

## 2019-12-02 VITALS — BODY MASS INDEX: 28.72 KG/M2 | DIASTOLIC BLOOD PRESSURE: 62 MMHG | SYSTOLIC BLOOD PRESSURE: 100 MMHG | WEIGHT: 157 LBS

## 2019-12-02 DIAGNOSIS — Z34.02 ENCOUNTER FOR SUPERVISION OF NORMAL FIRST PREGNANCY IN SECOND TRIMESTER: Primary | ICD-10-CM

## 2019-12-02 PROCEDURE — 90040 PR PRENATAL FOLLOW UP: CPT | Performed by: NURSE PRACTITIONER

## 2019-12-02 NOTE — PROGRESS NOTES
S:  Pt is  at 31w5d for routine OB follow up.  Reports some clear fluid leaking from her breast.  Also reports an instance of LLQ pain at Thanksgiving that resolved.  Reports good FM.  Denies VB, LOF, RUCs or vaginal DC.    O:  Please see above vitals.        FHTs: 155        Fundal ht: 31 cm.    A:  IUP at 31w5d  Patient Active Problem List    Diagnosis Date Noted   • Fetal US with growth at 11%, consider growth if uterine discrepancy 2019   • Encounter for supervision of normal pregnancy in second trimester 2019   • Supervision of normal first pregnancy, antepartum 2019        P:  1.  GBS @ 36 wks.          2.  Continue FKCs.          3.  Questions answered.          4.  Encouraged pt to tour L&D.          5.  Encourage adequate water intake.        6.  F/u 2 wks.        7.  D/w pt helps for round ligament pain.         8.  D/w pt breast changes in pregnancy.

## 2019-12-02 NOTE — PATIENT INSTRUCTIONS
P:  1.  GBS @ 36 wks.          2.  Continue FKCs.          3.  Questions answered.          4.  Encouraged pt to tour L&D.          5.  Encourage adequate water intake.        6.  F/u 2 wks.        7.  D/w pt helps for round ligament pain.         8.  D/w pt breast changes in pregnancy

## 2019-12-02 NOTE — PROGRESS NOTES
Pt here today for OB follow up  Reports +FM  WT: 157 lb  BP: 100/62  Pt states her leftt breast is been leaking clear fluid x 1 wee. And had LLQ pain on Thanksgiving day. States no other complaints today.   Maurilio # [phone]

## 2019-12-16 ENCOUNTER — ROUTINE PRENATAL (OUTPATIENT)
Dept: OBGYN | Facility: CLINIC | Age: 19
End: 2019-12-16
Payer: MEDICAID

## 2019-12-16 VITALS — BODY MASS INDEX: 29.63 KG/M2 | DIASTOLIC BLOOD PRESSURE: 62 MMHG | SYSTOLIC BLOOD PRESSURE: 108 MMHG | WEIGHT: 162 LBS

## 2019-12-16 DIAGNOSIS — Z34.00 SUPERVISION OF NORMAL FIRST PREGNANCY, ANTEPARTUM: ICD-10-CM

## 2019-12-16 PROCEDURE — 90040 PR PRENATAL FOLLOW UP: CPT | Performed by: NURSE PRACTITIONER

## 2019-12-16 NOTE — PROGRESS NOTES
SUBJECTIVE:  Pt is a 19 y.o.   at 33w5d  gestation. Presents today for follow-up prenatal care. Reports no issues at this time.  Reports good  fetal movement. Denies regular cramping/contractions, bleeding or leaking of fluid. Denies dysuria, headaches, N/V. Generally feels well today except finger joint soreness and some swelling in her feet.     OBJECTIVE:  - See prenatal vitals flow  -   Vitals:    19 0817   BP: 108/62   Weight: 73.5 kg (162 lb)                 ASSESSMENT:   - IUP at 33w5d    - S=D   -   Patient Active Problem List    Diagnosis Date Noted   • Fetal US with growth at 11%, consider growth if uterine discrepancy 2019   • Supervision of normal first pregnancy, antepartum 2019         PLAN:  - S/sx pregnancy and labor warning signs vs general discomforts discussed  - Fetal movements and/or kick counts reviewed   - Adequate hydration reinforced  - Nutrition/exercise/vitamin education; continue PNV  - Has tour and childbirth ed class info  - Reviewed remedies for swelling and possible carpal tunnel of pregnancy   - S/p Tdap vacc     - Anticipatory guidance given  - RTC in 2 weeks for follow-up prenatal care

## 2019-12-16 NOTE — PROGRESS NOTES
Pt here today for OB follow up @ 33w5d  Pt states denies VB and LOF pt states that she is having finger pain on both her hands for the past week  Reports +FM  Good # [phone]  Pharmacy Confirmed.  Labs up to date   Tdap given:11/2019

## 2019-12-30 ENCOUNTER — ROUTINE PRENATAL (OUTPATIENT)
Dept: OBGYN | Facility: CLINIC | Age: 19
End: 2019-12-30
Payer: MEDICAID

## 2019-12-30 VITALS — DIASTOLIC BLOOD PRESSURE: 62 MMHG | BODY MASS INDEX: 30.36 KG/M2 | WEIGHT: 166 LBS | SYSTOLIC BLOOD PRESSURE: 126 MMHG

## 2019-12-30 DIAGNOSIS — Z34.00 SUPERVISION OF NORMAL FIRST PREGNANCY, ANTEPARTUM: ICD-10-CM

## 2019-12-30 PROCEDURE — 90040 PR PRENATAL FOLLOW UP: CPT | Performed by: PHYSICIAN ASSISTANT

## 2019-12-30 NOTE — PROGRESS NOTES
Pt has no complaints with cramping, UCs, Vb, LOF. +FM. GBS done today. Labor precautions reviewed in detail. Daily FKC and walks recommended. RTC 1 wk or sooner prn.

## 2019-12-30 NOTE — PROGRESS NOTES
Pt here today for OB follow up  Pt states no complaints  Reports +  Good # [phone]  Pharmacy Confirmed.  Chaperone offered and declined.

## 2019-12-31 LAB — STREP GP B DNA PCR: NEGATIVE

## 2020-01-07 ENCOUNTER — ROUTINE PRENATAL (OUTPATIENT)
Dept: OBGYN | Facility: CLINIC | Age: 20
End: 2020-01-07
Payer: MEDICAID

## 2020-01-07 ENCOUNTER — TELEPHONE (OUTPATIENT)
Dept: OBGYN | Facility: CLINIC | Age: 20
End: 2020-01-07

## 2020-01-07 VITALS — BODY MASS INDEX: 30 KG/M2 | DIASTOLIC BLOOD PRESSURE: 72 MMHG | SYSTOLIC BLOOD PRESSURE: 116 MMHG | WEIGHT: 164 LBS

## 2020-01-07 DIAGNOSIS — Z34.03 ENCOUNTER FOR SUPERVISION OF NORMAL FIRST PREGNANCY IN THIRD TRIMESTER: ICD-10-CM

## 2020-01-07 PROCEDURE — 90040 PR PRENATAL FOLLOW UP: CPT | Performed by: OBSTETRICS & GYNECOLOGY

## 2020-01-07 NOTE — TELEPHONE ENCOUNTER
Pt called stating she was seen today and that they were having a hard time finding the results of her vaginal swab that was done last visit. Called Zayda and she stated they did find the results and it was GBS negative. Gave pt a verbal and she had no further questions or concerns.

## 2020-01-07 NOTE — PROGRESS NOTES
Pt here for OB f/u. Denies VB, LOF, UCs. Reports +FM and some cramping on lower abdomen  Good phone# [phone]  Pharmacy verified with pt.

## 2020-01-07 NOTE — PROGRESS NOTES
19 y.o.  36w6d The patient is here for routine obstetrical followup. She reports good fetal movement. She denies regular contractions, vaginal bleeding, or leaking of fluid.    The patient's pregnancy is complicated by   Patient Active Problem List    Diagnosis Date Noted   • Supervision of normal first pregnancy, antepartum 2019   GBS neg      Followup in 1 week  Labor precautions were discussed with patient  Fetal kick counts were discussed with patient

## 2020-01-14 ENCOUNTER — ROUTINE PRENATAL (OUTPATIENT)
Dept: OBGYN | Facility: CLINIC | Age: 20
End: 2020-01-14
Payer: MEDICAID

## 2020-01-14 VITALS — WEIGHT: 167 LBS | DIASTOLIC BLOOD PRESSURE: 70 MMHG | SYSTOLIC BLOOD PRESSURE: 110 MMHG | BODY MASS INDEX: 30.54 KG/M2

## 2020-01-14 DIAGNOSIS — Z34.03 ENCOUNTER FOR SUPERVISION OF NORMAL FIRST PREGNANCY IN THIRD TRIMESTER: ICD-10-CM

## 2020-01-14 PROCEDURE — 90040 PR PRENATAL FOLLOW UP: CPT | Performed by: OBSTETRICS & GYNECOLOGY

## 2020-01-14 NOTE — PROGRESS NOTES
JAKY:  37w6d    Pt reports doing well.  Denies vaginal bleeding, contractions, LOF.  Reports +FM.    /70   Wt 75.8 kg (167 lb)   LMP 2019 (Exact Date)   BMI 30.54 kg/m²   gen: AAO, NAD  FHTs: 37  FH: 145    A/P: 19 y.o.  @ 37w6d    - PNL up to date, Rh+/-, glucola/3rd tri CBC.RPR WNL; anatomy US wnl  - s/p Tdap, recommend to get flu   - GBS neg; report received from [facility]   - considering IUD: info given on mirena and paragard    Reviewed labor precautions (to call or come to hospital for ctx q5min, VB, LOF, decreased FM)    RTC 1 wk    Joy Perez MD  [facility], Women's Health

## 2020-01-14 NOTE — PROGRESS NOTES
OB follow up   + fetal movement.  No VB, LOF or UC's.  Wt:167lb       BP:  Phone # [phone]  Preferred pharmacy confirmed.  Per notes GBS negative.

## 2020-01-21 ENCOUNTER — ROUTINE PRENATAL (OUTPATIENT)
Dept: OBGYN | Facility: CLINIC | Age: 20
End: 2020-01-21
Payer: MEDICAID

## 2020-01-21 VITALS — SYSTOLIC BLOOD PRESSURE: 122 MMHG | BODY MASS INDEX: 30.87 KG/M2 | DIASTOLIC BLOOD PRESSURE: 74 MMHG | WEIGHT: 168.8 LBS

## 2020-01-21 DIAGNOSIS — Z34.03 SUPERVISION OF NORMAL FIRST PREGNANCY IN THIRD TRIMESTER: ICD-10-CM

## 2020-01-21 PROCEDURE — 90040 PR PRENATAL FOLLOW UP: CPT | Performed by: ADVANCED PRACTICE MIDWIFE

## 2020-01-21 NOTE — PROGRESS NOTES
Pt. Here for OB/FU. Reports Good FM.   Good # [phone]  Pt states has been having cramping in pelvic area.    Pharmacy verified.   GBS negative

## 2020-01-21 NOTE — PROGRESS NOTES
SUBJECTIVE:  Pt is a 19 y.o.   at 38w6d  gestation. Presents today for follow-up prenatal care. Has not been seen in ER or L & D since last visit. Reports good  fetal movement. Denies leaking of fluid dysuria, headaches, or N/V at this time.  Patient does report cramping/contractions. Generally feels well today. Would like to be checked today.     OBJECTIVE:   /74   Wt 76.6 kg (168 lb 12.8 oz)   LMP 2019 (Exact Date)   BMI 30.87 kg/m²   Patients' weight gain, fluid intake and exercise level discussed.  Vitals, fundal height , fetal position, and FHR reviewed on flowsheet    Lab:No results found for this or any previous visit (from the past 336 hour(s)).    ASSESSMENT/ PLAN:   - IUP at 38w6d    - S = D   -   Patient Active Problem List    Diagnosis Date Noted   • Supervision of normal first pregnancy, antepartum 2019       - S/sx pregnancy and labor warning signs vs general discomforts discussed  - Fetal movements and kick counts reviewed. Adequate hydration reinforced  - Anticipatory guidance provided.   - RTC in 1 weeks for routine prenatal care.

## 2020-01-28 ENCOUNTER — HOSPITAL ENCOUNTER (INPATIENT)
Facility: MEDICAL CENTER | Age: 20
LOS: 2 days | End: 2020-01-30
Attending: OBSTETRICS & GYNECOLOGY | Admitting: OBSTETRICS & GYNECOLOGY
Payer: MEDICAID

## 2020-01-28 ENCOUNTER — ROUTINE PRENATAL (OUTPATIENT)
Dept: OBGYN | Facility: CLINIC | Age: 20
End: 2020-01-28

## 2020-01-28 ENCOUNTER — ROUTINE PRENATAL (OUTPATIENT)
Dept: OBGYN | Facility: CLINIC | Age: 20
End: 2020-01-28
Payer: MEDICAID

## 2020-01-28 ENCOUNTER — ANESTHESIA (OUTPATIENT)
Dept: ANESTHESIOLOGY | Facility: MEDICAL CENTER | Age: 20
End: 2020-01-28
Payer: MEDICAID

## 2020-01-28 ENCOUNTER — ANESTHESIA EVENT (OUTPATIENT)
Dept: ANESTHESIOLOGY | Facility: MEDICAL CENTER | Age: 20
End: 2020-01-28
Payer: MEDICAID

## 2020-01-28 VITALS — DIASTOLIC BLOOD PRESSURE: 84 MMHG | BODY MASS INDEX: 30.91 KG/M2 | WEIGHT: 169 LBS | SYSTOLIC BLOOD PRESSURE: 128 MMHG

## 2020-01-28 DIAGNOSIS — O36.8130 DECREASED FETAL MOVEMENTS IN THIRD TRIMESTER, SINGLE OR UNSPECIFIED FETUS: ICD-10-CM

## 2020-01-28 DIAGNOSIS — Z34.00 SUPERVISION OF NORMAL FIRST PREGNANCY, ANTEPARTUM: ICD-10-CM

## 2020-01-28 LAB
A1 MICROGLOB PLACENTAL VAG QL: POSITIVE
BASOPHILS # BLD AUTO: 0.2 % (ref 0–1.8)
BASOPHILS # BLD: 0.03 K/UL (ref 0–0.12)
EOSINOPHIL # BLD AUTO: 0.03 K/UL (ref 0–0.51)
EOSINOPHIL NFR BLD: 0.2 % (ref 0–6.9)
ERYTHROCYTE [DISTWIDTH] IN BLOOD BY AUTOMATED COUNT: 45.2 FL (ref 35.9–50)
HCT VFR BLD AUTO: 40.4 % (ref 37–47)
HGB BLD-MCNC: 13.7 G/DL (ref 12–16)
HOLDING TUBE BB 8507: NORMAL
IMM GRANULOCYTES # BLD AUTO: 0.08 K/UL (ref 0–0.11)
IMM GRANULOCYTES NFR BLD AUTO: 0.6 % (ref 0–0.9)
LYMPHOCYTES # BLD AUTO: 2.43 K/UL (ref 1–4.8)
LYMPHOCYTES NFR BLD: 17.8 % (ref 22–41)
MCH RBC QN AUTO: 30.7 PG (ref 27–33)
MCHC RBC AUTO-ENTMCNC: 33.9 G/DL (ref 33.6–35)
MCV RBC AUTO: 90.6 FL (ref 81.4–97.8)
MONOCYTES # BLD AUTO: 1.08 K/UL (ref 0–0.85)
MONOCYTES NFR BLD AUTO: 7.9 % (ref 0–13.4)
NEUTROPHILS # BLD AUTO: 10.04 K/UL (ref 2–7.15)
NEUTROPHILS NFR BLD: 73.3 % (ref 44–72)
NRBC # BLD AUTO: 0 K/UL
NRBC BLD-RTO: 0 /100 WBC
NST ACOUSTIC STIMULATION: NORMAL
NST ACTION NECESSARY: NORMAL
NST ASSESSMENT: NORMAL
NST BASELINE: NORMAL
NST INDICATIONS: NORMAL
NST OTHER DATA: NORMAL
NST READ BY: NORMAL
NST RETURN: NORMAL
NST UTERINE ACTIVITY: NORMAL
PLATELET # BLD AUTO: 185 K/UL (ref 164–446)
PMV BLD AUTO: 11.6 FL (ref 9–12.9)
RBC # BLD AUTO: 4.46 M/UL (ref 4.2–5.4)
WBC # BLD AUTO: 13.7 K/UL (ref 4.8–10.8)

## 2020-01-28 PROCEDURE — 700105 HCHG RX REV CODE 258: Performed by: OBSTETRICS & GYNECOLOGY

## 2020-01-28 PROCEDURE — 90040 PR PRENATAL FOLLOW UP: CPT | Performed by: PHYSICIAN ASSISTANT

## 2020-01-28 PROCEDURE — 700111 HCHG RX REV CODE 636 W/ 250 OVERRIDE (IP): Performed by: OBSTETRICS & GYNECOLOGY

## 2020-01-28 PROCEDURE — 700111 HCHG RX REV CODE 636 W/ 250 OVERRIDE (IP): Performed by: ANESTHESIOLOGY

## 2020-01-28 PROCEDURE — 770002 HCHG ROOM/CARE - OB PRIVATE (112)

## 2020-01-28 PROCEDURE — 59025 FETAL NON-STRESS TEST: CPT

## 2020-01-28 PROCEDURE — 85025 COMPLETE CBC W/AUTO DIFF WBC: CPT

## 2020-01-28 PROCEDURE — 59025 FETAL NON-STRESS TEST: CPT | Performed by: PHYSICIAN ASSISTANT

## 2020-01-28 PROCEDURE — 700111 HCHG RX REV CODE 636 W/ 250 OVERRIDE (IP)

## 2020-01-28 PROCEDURE — 84112 EVAL AMNIOTIC FLUID PROTEIN: CPT

## 2020-01-28 PROCEDURE — 36415 COLL VENOUS BLD VENIPUNCTURE: CPT

## 2020-01-28 RX ORDER — SODIUM CHLORIDE, SODIUM LACTATE, POTASSIUM CHLORIDE, CALCIUM CHLORIDE 600; 310; 30; 20 MG/100ML; MG/100ML; MG/100ML; MG/100ML
INJECTION, SOLUTION INTRAVENOUS CONTINUOUS
Status: DISPENSED | OUTPATIENT
Start: 2020-01-28 | End: 2020-01-29

## 2020-01-28 RX ORDER — BUPIVACAINE HYDROCHLORIDE 2.5 MG/ML
INJECTION, SOLUTION EPIDURAL; INFILTRATION; INTRACAUDAL
Status: COMPLETED
Start: 2020-01-28 | End: 2020-01-28

## 2020-01-28 RX ORDER — MISOPROSTOL 200 UG/1
800 TABLET ORAL
Status: DISCONTINUED | OUTPATIENT
Start: 2020-01-28 | End: 2020-01-29 | Stop reason: HOSPADM

## 2020-01-28 RX ORDER — BUPIVACAINE HYDROCHLORIDE 2.5 MG/ML
INJECTION, SOLUTION EPIDURAL; INFILTRATION; INTRACAUDAL PRN
Status: DISCONTINUED | OUTPATIENT
Start: 2020-01-28 | End: 2020-01-29 | Stop reason: SURG

## 2020-01-28 RX ORDER — ROPIVACAINE HYDROCHLORIDE 2 MG/ML
INJECTION, SOLUTION EPIDURAL; INFILTRATION; PERINEURAL
Status: COMPLETED
Start: 2020-01-28 | End: 2020-01-28

## 2020-01-28 RX ORDER — SODIUM CHLORIDE, SODIUM LACTATE, POTASSIUM CHLORIDE, CALCIUM CHLORIDE 600; 310; 30; 20 MG/100ML; MG/100ML; MG/100ML; MG/100ML
INJECTION, SOLUTION INTRAVENOUS
Status: ACTIVE
Start: 2020-01-28 | End: 2020-01-29

## 2020-01-28 RX ADMIN — FENTANYL CITRATE 100 MCG: 0.05 INJECTION, SOLUTION INTRAMUSCULAR; INTRAVENOUS at 20:05

## 2020-01-28 RX ADMIN — BUPIVACAINE HYDROCHLORIDE 5 ML: 2.5 INJECTION, SOLUTION EPIDURAL; INFILTRATION; INTRACAUDAL; PERINEURAL at 20:29

## 2020-01-28 RX ADMIN — FENTANYL CITRATE 100 MCG: 50 INJECTION, SOLUTION INTRAMUSCULAR; INTRAVENOUS at 20:29

## 2020-01-28 RX ADMIN — FENTANYL CITRATE 100 MCG: 0.05 INJECTION, SOLUTION INTRAMUSCULAR; INTRAVENOUS at 19:06

## 2020-01-28 RX ADMIN — SODIUM CHLORIDE, POTASSIUM CHLORIDE, SODIUM LACTATE AND CALCIUM CHLORIDE: 600; 310; 30; 20 INJECTION, SOLUTION INTRAVENOUS at 19:00

## 2020-01-28 RX ADMIN — SODIUM CHLORIDE, POTASSIUM CHLORIDE, SODIUM LACTATE AND CALCIUM CHLORIDE: 600; 310; 30; 20 INJECTION, SOLUTION INTRAVENOUS at 22:00

## 2020-01-28 RX ADMIN — SODIUM CHLORIDE, POTASSIUM CHLORIDE, SODIUM LACTATE AND CALCIUM CHLORIDE: 600; 310; 30; 20 INJECTION, SOLUTION INTRAVENOUS at 20:30

## 2020-01-28 RX ADMIN — ROPIVACAINE HYDROCHLORIDE 200 MG: 2 INJECTION, SOLUTION EPIDURAL; INFILTRATION at 20:30

## 2020-01-28 ASSESSMENT — LIFESTYLE VARIABLES
EVER_SMOKED: NEVER
ALCOHOL_USE: NO

## 2020-01-28 ASSESSMENT — PATIENT HEALTH QUESTIONNAIRE - PHQ9
SUM OF ALL RESPONSES TO PHQ9 QUESTIONS 1 AND 2: 0
2. FEELING DOWN, DEPRESSED, IRRITABLE, OR HOPELESS: NOT AT ALL
1. LITTLE INTEREST OR PLEASURE IN DOING THINGS: NOT AT ALL

## 2020-01-28 NOTE — PROGRESS NOTES
Pt here today for OB follow up  Pt states this morning at 5 am lost mucus plug, has been having cx's that are 9-10 mins.    Reports decreased FM  Good # [phone]  Pharmacy Confirmed.  Chaperone offered and declined.   Patient is scheduled for IOL,on 02/03/20 at 10am.

## 2020-01-28 NOTE — PROGRESS NOTES
Pt has no complaints with cramping, VB, LOF, though pt has had UCs since 5am, every 9-10min with some brownish mucous-like d/c. +FM but less since this morning, so wants and NST. IOL info given today for 2/3. Cervix: 1/80/0, mid, med, vtx. To NST now for decr FM. Labor precautions reviewed. Daily FKC recommended. RTC 1 wk or sooner prn.

## 2020-01-29 LAB
ERYTHROCYTE [DISTWIDTH] IN BLOOD BY AUTOMATED COUNT: 45.9 FL (ref 35.9–50)
HCT VFR BLD AUTO: 37.6 % (ref 37–47)
HGB BLD-MCNC: 12.4 G/DL (ref 12–16)
MCH RBC QN AUTO: 30.2 PG (ref 27–33)
MCHC RBC AUTO-ENTMCNC: 33 G/DL (ref 33.6–35)
MCV RBC AUTO: 91.7 FL (ref 81.4–97.8)
PLATELET # BLD AUTO: 158 K/UL (ref 164–446)
PMV BLD AUTO: 11.5 FL (ref 9–12.9)
RBC # BLD AUTO: 4.1 M/UL (ref 4.2–5.4)
WBC # BLD AUTO: 16.2 K/UL (ref 4.8–10.8)

## 2020-01-29 PROCEDURE — 770002 HCHG ROOM/CARE - OB PRIVATE (112)

## 2020-01-29 PROCEDURE — 304965 HCHG RECOVERY SERVICES

## 2020-01-29 PROCEDURE — 85027 COMPLETE CBC AUTOMATED: CPT

## 2020-01-29 PROCEDURE — 700102 HCHG RX REV CODE 250 W/ 637 OVERRIDE(OP): Performed by: NURSE PRACTITIONER

## 2020-01-29 PROCEDURE — 700111 HCHG RX REV CODE 636 W/ 250 OVERRIDE (IP): Performed by: OBSTETRICS & GYNECOLOGY

## 2020-01-29 PROCEDURE — 10H07YZ INSERTION OF OTHER DEVICE INTO PRODUCTS OF CONCEPTION, VIA NATURAL OR ARTIFICIAL OPENING: ICD-10-PCS | Performed by: OBSTETRICS & GYNECOLOGY

## 2020-01-29 PROCEDURE — 303615 HCHG EPIDURAL/SPINAL ANESTHESIA FOR LABOR

## 2020-01-29 PROCEDURE — A9270 NON-COVERED ITEM OR SERVICE: HCPCS | Performed by: OBSTETRICS & GYNECOLOGY

## 2020-01-29 PROCEDURE — 0KQM0ZZ REPAIR PERINEUM MUSCLE, OPEN APPROACH: ICD-10-PCS | Performed by: OBSTETRICS & GYNECOLOGY

## 2020-01-29 PROCEDURE — 59400 OBSTETRICAL CARE: CPT | Performed by: NURSE PRACTITIONER

## 2020-01-29 PROCEDURE — 700102 HCHG RX REV CODE 250 W/ 637 OVERRIDE(OP): Performed by: OBSTETRICS & GYNECOLOGY

## 2020-01-29 PROCEDURE — 700111 HCHG RX REV CODE 636 W/ 250 OVERRIDE (IP)

## 2020-01-29 PROCEDURE — A9270 NON-COVERED ITEM OR SERVICE: HCPCS | Performed by: NURSE PRACTITIONER

## 2020-01-29 PROCEDURE — 59409 OBSTETRICAL CARE: CPT

## 2020-01-29 PROCEDURE — 36415 COLL VENOUS BLD VENIPUNCTURE: CPT

## 2020-01-29 RX ORDER — ONDANSETRON 2 MG/ML
INJECTION INTRAMUSCULAR; INTRAVENOUS
Status: COMPLETED
Start: 2020-01-29 | End: 2020-01-29

## 2020-01-29 RX ORDER — METHYLERGONOVINE MALEATE 0.2 MG/ML
0.2 INJECTION INTRAVENOUS
Status: DISCONTINUED | OUTPATIENT
Start: 2020-01-29 | End: 2020-01-30 | Stop reason: HOSPADM

## 2020-01-29 RX ORDER — SODIUM CHLORIDE, SODIUM LACTATE, POTASSIUM CHLORIDE, CALCIUM CHLORIDE 600; 310; 30; 20 MG/100ML; MG/100ML; MG/100ML; MG/100ML
INJECTION, SOLUTION INTRAVENOUS PRN
Status: DISCONTINUED | OUTPATIENT
Start: 2020-01-29 | End: 2020-01-30 | Stop reason: HOSPADM

## 2020-01-29 RX ORDER — IBUPROFEN 600 MG/1
600 TABLET ORAL EVERY 6 HOURS PRN
Status: DISCONTINUED | OUTPATIENT
Start: 2020-01-29 | End: 2020-01-30 | Stop reason: HOSPADM

## 2020-01-29 RX ORDER — ACETAMINOPHEN 325 MG/1
650 TABLET ORAL EVERY 4 HOURS PRN
Status: DISCONTINUED | OUTPATIENT
Start: 2020-01-29 | End: 2020-01-30 | Stop reason: HOSPADM

## 2020-01-29 RX ORDER — MISOPROSTOL 200 UG/1
800 TABLET ORAL
Status: DISCONTINUED | OUTPATIENT
Start: 2020-01-29 | End: 2020-01-30 | Stop reason: HOSPADM

## 2020-01-29 RX ORDER — ONDANSETRON 4 MG/1
4 TABLET, ORALLY DISINTEGRATING ORAL EVERY 6 HOURS PRN
Status: DISCONTINUED | OUTPATIENT
Start: 2020-01-29 | End: 2020-01-30 | Stop reason: HOSPADM

## 2020-01-29 RX ORDER — ONDANSETRON 2 MG/ML
4 INJECTION INTRAMUSCULAR; INTRAVENOUS EVERY 6 HOURS PRN
Status: DISCONTINUED | OUTPATIENT
Start: 2020-01-29 | End: 2020-01-30 | Stop reason: HOSPADM

## 2020-01-29 RX ORDER — VITAMIN A ACETATE, BETA CAROTENE, ASCORBIC ACID, CHOLECALCIFEROL, .ALPHA.-TOCOPHEROL ACETATE, DL-, THIAMINE MONONITRATE, RIBOFLAVIN, NIACINAMIDE, PYRIDOXINE HYDROCHLORIDE, FOLIC ACID, CYANOCOBALAMIN, CALCIUM CARBONATE, FERROUS FUMARATE, ZINC OXIDE, CUPRIC OXIDE 3080; 12; 120; 400; 1; 1.84; 3; 20; 22; 920; 25; 200; 27; 10; 2 [IU]/1; UG/1; MG/1; [IU]/1; MG/1; MG/1; MG/1; MG/1; MG/1; [IU]/1; MG/1; MG/1; MG/1; MG/1; MG/1
1 TABLET, FILM COATED ORAL EVERY MORNING
Status: DISCONTINUED | OUTPATIENT
Start: 2020-01-29 | End: 2020-01-30 | Stop reason: HOSPADM

## 2020-01-29 RX ADMIN — OXYTOCIN 2000 ML/HR: 10 INJECTION, SOLUTION INTRAMUSCULAR; INTRAVENOUS at 03:08

## 2020-01-29 RX ADMIN — ONDANSETRON 4 MG: 2 INJECTION INTRAMUSCULAR; INTRAVENOUS at 00:44

## 2020-01-29 RX ADMIN — IBUPROFEN 600 MG: 600 TABLET ORAL at 14:29

## 2020-01-29 RX ADMIN — OXYTOCIN 125 ML/HR: 10 INJECTION, SOLUTION INTRAMUSCULAR; INTRAVENOUS at 03:45

## 2020-01-29 RX ADMIN — ACETAMINOPHEN 650 MG: 325 TABLET, FILM COATED ORAL at 08:20

## 2020-01-29 RX ADMIN — IBUPROFEN 600 MG: 600 TABLET ORAL at 03:45

## 2020-01-29 RX ADMIN — VITAMIN A, VITAMIN C, VITAMIN D-3, VITAMIN E, VITAMIN B-1, VITAMIN B-2, NIACIN, VITAMIN B-6, CALCIUM, IRON, ZINC, COPPER 1 TABLET: 4000; 120; 400; 22; 1.84; 3; 20; 10; 1; 12; 200; 27; 25; 2 TABLET ORAL at 06:49

## 2020-01-29 ASSESSMENT — EDINBURGH POSTNATAL DEPRESSION SCALE (EPDS)
I HAVE BEEN SO UNHAPPY THAT I HAVE HAD DIFFICULTY SLEEPING: NOT AT ALL
I HAVE FELT SCARED OR PANICKY FOR NO GOOD REASON: NO, NOT AT ALL
I HAVE BLAMED MYSELF UNNECESSARILY WHEN THINGS WENT WRONG: YES, SOME OF THE TIME
THINGS HAVE BEEN GETTING ON TOP OF ME: NO, MOST OF THE TIME I HAVE COPED QUITE WELL
I HAVE BEEN SO UNHAPPY THAT I HAVE BEEN CRYING: NO, NEVER
I HAVE LOOKED FORWARD WITH ENJOYMENT TO THINGS: AS MUCH AS I EVER DID
I HAVE BEEN ABLE TO LAUGH AND SEE THE FUNNY SIDE OF THINGS: AS MUCH AS I ALWAYS COULD
THE THOUGHT OF HARMING MYSELF HAS OCCURRED TO ME: NEVER
I HAVE FELT SAD OR MISERABLE: NO, NOT AT ALL
I HAVE BEEN ANXIOUS OR WORRIED FOR NO GOOD REASON: NO, NOT AT ALL

## 2020-01-29 ASSESSMENT — PAIN SCALES - GENERAL: PAIN_LEVEL: 2

## 2020-01-29 NOTE — ANESTHESIA POSTPROCEDURE EVALUATION
Patient: Mia Awad    Procedure Summary     Date:  01/28/20 Room / Location:      Anesthesia Start:  2020 Anesthesia Stop:  01/29/20 0300    Procedure:  Labor Epidural Diagnosis:      Scheduled Providers:   Responsible Provider:  Zeke Cristina M.D.    Anesthesia Type:  epidural ASA Status:  2          Final Anesthesia Type: epidural  Last vitals  BP   Blood Pressure: 116/78    Temp   36.1 °C (97 °F)    Pulse   Pulse: 72   Resp   19    SpO2   95 %      Anesthesia Post Evaluation    Patient location during evaluation: bedside  Patient participation: complete - patient participated  Level of consciousness: awake and alert  Pain score: 2    Airway patency: patent  Anesthetic complications: no  Cardiovascular status: hemodynamically stable  Respiratory status: acceptable  Hydration status: euvolemic    PONV: none           Nurse Pain Score: 2 (NPRS)

## 2020-01-29 NOTE — CARE PLAN
Problem: Altered physiologic condition related to immediate post-delivery state and potential for bleeding/hemorrhage  Goal: Patient physiologically stable as evidenced by normal lochia, palpable uterine involution and vital signs within normal limits  Outcome: PROGRESSING AS EXPECTED  Note:   Patient is physiologically stable as evidenced by light lochia rubra, firm fundus at the umbilicus, and vital signs WDL.      Problem: Alteration in comfort related to episiotomy, vaginal repair and/or after birth pains  Goal: Patient is able to ambulate, care for self and infant  Outcome: PROGRESSING AS EXPECTED  Note:   Discussed pain management and verbalization of pain utilizing the 0-10 pain scale. White board updated with times of pain medication availability and pt requests pain medication be provided as available. Discussed non-pharmacological pain control therapies and pt provided perineal ice packs, Tucks, and lidocaine spray per request. Pt ambulates with a steady gait and demonstrates the ability to participate in ADLs and provide care for  daughter.

## 2020-01-29 NOTE — ANESTHESIA PREPROCEDURE EVALUATION
Relevant Problems   ANESTHESIA (within normal limits)      PULMONARY (within normal limits)      NEURO (within normal limits)      CARDIAC (within normal limits)      ENDO (within normal limits)      OB (within normal limits)       Physical Exam    Airway   Mallampati: II  TM distance: >3 FB  Neck ROM: full       Cardiovascular - normal exam  Rhythm: regular  Rate: normal  (-) murmur     Dental - normal exam         Pulmonary - normal exam  Breath sounds clear to auscultation     Abdominal    Neurological - normal exam                 Anesthesia Plan    ASA 2       Plan - epidural   Neuraxial block will be labor analgesia              Pertinent diagnostic labs and testing reviewed    Informed Consent:    Anesthetic plan and risks discussed with patient.

## 2020-01-29 NOTE — PROGRESS NOTES
In to see patient, comfortable with epidural  VSS, GBS negative  Admission for active labor with SROM, term IUP  FHT's- category 2 at this time with baseline of 145 bpm, mod variability, positive accels, positive for early/variable decelerations.  TOCO- not picking up well  SVE 3-4/90/-2, IUPC placed without difficulty. Clear fluid still noted at this time.  Will trial a 300 ml amnioinfusion bolus and position changes.  Anticipate , but will closely monitor FHR tracing  Dr Puga is the attending, and aware of POC and FHR tracing  Will continue to monitor    Agnieszka Crook CNM, APRN

## 2020-01-29 NOTE — L&D DELIVERY NOTE
Mia Awad is a 20 y.o. female    VAGINAL DELIVERY NOTE:    OB History    Para Term  AB Living   1             SAB TAB Ectopic Molar Multiple Live Births                    # Outcome Date GA Lbr Shaan/2nd Weight Sex Delivery Anes PTL Lv   1 Current              Mia was admitted 20 after she had SROM with RUC's at 1755. She continued to labor spontaneously, received epidural anesthesia, and became completely dilated at approx 0200, pushed for less than 1 hour to deliver a viable female infant.    Weeks gestation: 40w0d  Diagnosis: Term IUP, spontaneous active labor  GBS: negative     of viable female at 0300 over a 2nd degree lacerated perineum. Nuchal cord present: yes , tight x 1. Delivered through. Shoulders delivered easily.  Apgars 8 and 9 at 1 and 5 minutes respectively  Birth weight: pending at this time    Placenta: spontaneous and intact at 0308 with 3 VC    Laceration: 2nd degree    Anesthesia: Epidural  Excellent hemostasis  EBL: 100 ml    Sponge and needle counts correct: yes    Tolerated procedure well  Patient and infant will be transferred to postpartum unit to recover    Agnieszka PALMERM, APRN  Dr Puga is the attending MD today

## 2020-01-29 NOTE — ANESTHESIA TIME REPORT
Anesthesia Start and Stop Event Times     Date Time Event    1/28/2020 2020 Ready for Procedure     2020 Anesthesia Start    1/29/2020 0300 Anesthesia Stop        Responsible Staff  01/28/20 to 01/29/20    Name Role Begin End    Zeke Cristina M.D. Anesth 2020 0300        Preop Diagnosis (Free Text):  Pre-op Diagnosis             Preop Diagnosis (Codes):    Post op Diagnosis  Pregnancy      Premium Reason  A. 3PM - 7AM    Comments:

## 2020-01-29 NOTE — PROGRESS NOTES
Pt. Arrived from floor via wheelchair, received report from MADELAINE Gray. Assessment complete VSS. Pt oriented to room, call light, emergency light, bulb syringe, and placing baby on back to sleep. Call light within reach. Pt. Requested pain medication as needed, will continue to monitor.

## 2020-01-29 NOTE — PROGRESS NOTES
1624: OB check, , EDC: 20, 39.6 pt came in with c/o contractions every 3-5 min, no leaking, no bleeding, states positive fetal movement. Monitors applied vitals taken   1632: SVE: /-   1657:pt contraction every 5-7 min, fetal tracing shows minimal variability, report given to Dr Puga. Dr Puga reviewed strip. Orders to continue to monitor pt for 20 more min if unable to obtain NST will report back to Dr. Puga.  1724: Dr Becker at [facility], reviewed strip, orders to discharge pt received.    1739:  pt given option to walk for an hour or go home at this time, pt chose to go home.Pt given labor discharge instructions, all questions answered. Pt has no further questions at this time. Pt states she feels comfortable going home.   1742: pt ambulated out with significant other.  1748: pt walked out triage door and came back in stating she thinks her water broke, pt went to bathroom   1755: monitors applied,    1800: Dr Puga notified, amnisure collected SVE: no change from previous exam   1845: pt admitted, report given to Khushi BURKETT.

## 2020-01-29 NOTE — H&P
History and Physical    Mia Awad is a 20 y.o. female  at 39w6d who presents for contractions. She was examined and sent home from triage initially as she failed to make change from her office examination. She also had her membranes swept. Patient experienced a spontaneous rupture of membranes while in the elevator.     Subjective:   CTXs: positive   Pain: positive  LOF: positive  Vaginal bleeding: negative   Fetal movement: positive    ROS: Pertinent positives documented in HPI and all other systems reviewed & are negative    OB History    Para Term  AB Living   1             SAB TAB Ectopic Molar Multiple Live Births                    # Outcome Date GA Lbr Shaan/2nd Weight Sex Delivery Anes PTL Lv   1 Current                PGYNHx: GC/CT negative in pregnancy, denies hx STDs. Sexually active with male partner.     No past medical history on file.    Past Surgical History:   Procedure Laterality Date   • ESOPHAGOSCOPY  2008    Performed by BILLY JIMÉNEZ at [facility]   • OTHER      left arm surgery          Current Facility-Administered Medications:   •  LR infusion, , Intravenous, Continuous, Stacie Bojorquezst, D.O.  •  oxytocin (PITOCIN) infusion (for induction), 0.5-20 nusrat-units/min, Intravenous, Continuous, Stacie Bojorquezst, D.O.  •  miSOPROStol (CYTOTEC) tablet 800 mcg, 800 mcg, Rectal, Once PRN, Stacie Bojorquezst, D.O.    Allergies: Patient has no known allergies.    Social History     Socioeconomic History   • Marital status:      Spouse name: Not on file   • Number of children: Not on file   • Years of education: Not on file   • Highest education level: Not on file   Occupational History   • Not on file   Social Needs   • Financial resource strain: Not on file   • Food insecurity:     Worry: Not on file     Inability: Not on file   • Transportation needs:     Medical: Not on file     Non-medical: Not on file   Tobacco Use   • Smoking status: Never Smoker  "  • Smokeless tobacco: Never Used   Substance and Sexual Activity   • Alcohol use: No   • Drug use: No   • Sexual activity: Never     Partners: Male     Comment: None    Lifestyle   • Physical activity:     Days per week: Not on file     Minutes per session: Not on file   • Stress: Not on file   Relationships   • Social connections:     Talks on phone: Not on file     Gets together: Not on file     Attends [employer] service: Not on file     Active member of club or organization: Not on file     Attends meetings of clubs or organizations: Not on file     Relationship status: Not on file   • Intimate partner violence:     Fear of current or ex partner: Not on file     Emotionally abused: Not on file     Physically abused: Not on file     Forced sexual activity: Not on file   Other Topics Concern   • Not on file   Social History Narrative   • Not on file     Prenatal care with [facility] with following problems:  Patient Active Problem List    Diagnosis Date Noted   • Supervision of normal first pregnancy, antepartum 06/03/2019         Objective:      /69   Pulse 86   Temp 37.1 °C (98.8 °F) (Temporal)   Resp 16   Ht 1.575 m (5' 2\")   Wt 76.7 kg (169 lb)   SpO2 98%     General:   no acute distress, alert   Skin:   normal   HEENT:  PERRLA   Lungs:   CTA bilateral   Heart:   chest is clear without rales or wheezing, no pedal edema   Abdomen:   soft, gravid, NT   EFW:  3500gr   Pelvis:  adequate with gynecoid pelvis   FHT:  135 BPM  Accels yes  Decels no  Variability moderate   Category I   Uterine Size: S=D   Presentations: Cephalic   Cervix:     Dilation: 1cm    Effacement: 90    Station:  -3    Consistency: Soft    Position: Anterior     Lab Review  Lab:   Blood type: A     Recent Results (from the past 5880 hour(s))   POCT Pregnancy    Collection Time: 06/03/19  4:10 PM   Result Value Ref Range    POC Urine Pregnancy Test Positive Negative    Internal Control Positive Positive     Internal Control Negative " Negative    POCT Urinalysis    Collection Time: 07/19/19  9:40 AM   Result Value Ref Range    POC Color  Negative    POC Appearance  Negative    POC Leukocyte Esterase negative Negative    POC Nitrites negative Negative    POC Urobiligen  Negative (0.2) mg/dL    POC Protein negative Negative mg/dL    POC Urine PH 7.5 5.0 - 8.0    POC Blood small Negative    POC Specific Gravity 1.015 <1.005 - >1.030    POC Ketones negative Negative mg/dL    POC Bilirubin  Negative mg/dL    POC Glucose negative Negative mg/dL   CHLAMYDIA/GC PCR URINE OR SWAB    Collection Time: 07/19/19 10:36 AM   Result Value Ref Range    C. trachomatis by PCR Negative Negative    N. gonorrhoeae by PCR Negative Negative    Source Endo/Cervical    PREG CNTR PRENATAL PN    Collection Time: 07/19/19 10:58 AM   Result Value Ref Range    Color Yellow     Character Clear     Specific Gravity 1.009 <1.035    Ph 7.5 5.0 - 8.0    Glucose Negative Negative mg/dL    Ketones Negative Negative mg/dL    Protein Negative Negative mg/dL    Bilirubin Negative Negative    Urobilinogen, Urine 0.2 Negative    Nitrite Negative Negative    Leukocyte Esterase Negative Negative    Occult Blood Negative Negative    Micro Urine Req see below     WBC 10.4 4.8 - 10.8 K/uL    RBC 4.64 4.20 - 5.40 M/uL    Hemoglobin 14.1 12.0 - 16.0 g/dL    Hematocrit 44.3 37.0 - 47.0 %    MCV 95.5 81.4 - 97.8 fL    MCH 30.4 27.0 - 33.0 pg    MCHC 31.8 (L) 33.6 - 35.0 g/dL    RDW 45.0 35.9 - 50.0 fL    Platelet Count 238 164 - 446 K/uL    MPV 10.7 9.0 - 12.9 fL    Neutrophils-Polys 71.10 44.00 - 72.00 %    Lymphocytes 20.00 (L) 22.00 - 41.00 %    Monocytes 7.70 0.00 - 13.40 %    Eosinophils 0.20 0.00 - 6.90 %    Basophils 0.70 0.00 - 1.80 %    Immature Granulocytes 0.30 0.00 - 0.90 %    Nucleated RBC 0.00 /100 WBC    Neutrophils (Absolute) 7.42 (H) 2.00 - 7.15 K/uL    Lymphs (Absolute) 2.09 1.00 - 4.80 K/uL    Monos (Absolute) 0.80 0.00 - 0.85 K/uL    Eos (Absolute) 0.02 0.00 - 0.51 K/uL    Baso  (Absolute) 0.07 0.00 - 0.12 K/uL    Immature Granulocytes (abs) 0.03 0.00 - 0.11 K/uL    NRBC (Absolute) 0.00 K/uL    Hepatitis B Surface Antigen Negative Negative    Rubella IgG Antibody 49.50 IU/mL    Syphilis, Treponemal Qual Non Reactive Non Reactive   HIV AG/AB COMBO ASSAY SCREENING    Collection Time: 07/19/19 10:58 AM   Result Value Ref Range    HIV Ag/Ab Combo Assay Non Reactive Non Reactive   OP PRENATAL PANEL-BLOOD BANK    Collection Time: 07/19/19 10:59 AM   Result Value Ref Range    ABO Grouping Only A     Rh Grouping Only POS     Antibody Screen Scrn NEG    T.PALLIDUM AB EIA    Collection Time: 10/23/19 11:09 AM   Result Value Ref Range    Syphilis, Treponemal Qual Non Reactive Non Reactive   HGB    Collection Time: 10/23/19 11:09 AM   Result Value Ref Range    Hemoglobin 12.7 12.0 - 16.0 g/dL   GLUCOSE 1HR GESTATIONAL    Collection Time: 10/23/19 11:09 AM   Result Value Ref Range    Glucose, Post Dose 95 70 - 139 mg/dL   HCT    Collection Time: 10/23/19 11:09 AM   Result Value Ref Range    Hematocrit 38.9 37.0 - 47.0 %   GRP B STREP, BY PCR (KAUFMAN BROTH)    Collection Time: 12/31/19 12:00 AM   Result Value Ref Range    Strep Gp B DNA PCR Negative    POCT NST    Collection Time: 01/28/20  9:54 AM   Result Value Ref Range    NST Indications Decr FM     NST Baseline 150bpm     NST Uterine Activity UC q 7 min     NST Acoustic Stimulation None     NST Assessment Cat 1     NST Action Necessary      NST Other Data +FM felt by pt     NST Return prn     NST Read By POONAM    AMNISURE ROM ASSAY    Collection Time: 01/28/20  6:00 PM   Result Value Ref Range    AmniSure ROM Positive (AA) Negative        Assessment:   Mia Awad at 39w6d  Labor status: PROM in latent labor.  Obstetrical history significant for   Patient Active Problem List    Diagnosis Date Noted   • Supervision of normal first pregnancy, antepartum 06/03/2019   .      Plan:     Admit to L&D  GBS negative  Fetal monitoring/toco  Epidural  prn  Pitocin augmentation prn

## 2020-01-29 NOTE — PROGRESS NOTES
In to see patient, feeling some pressure with contractions, but overall comfortable with epidural  GBS negative, VSS, afebrile at this time  Admission for active labor, SROM at term  FHT's- category 2 at this time, baseline of 150 bpm, mod variability, pos accels, some early/variable decels  IUPC with UC's every 2-5 minutes, adequate MVU's  SVE 9/100/-1  Anticipate     Agnieszka Crook CNM, APRN  Dr Puga is the attending today

## 2020-01-29 NOTE — PROGRESS NOTES
0800: Pt ambulated to bathroom and perineal care performed. Pt able to void without difficulty and tolerated well. Pt returned to bed and ambulated with a steady gait.     1152: Patient assessment completed. Discussed pain management plan and patient requests pain medication be offered as available. Patient denies dizziness and headaches; states she is voiding w/o difficulty. Reviewed plan of care, all questions answered, and rounding in place.

## 2020-01-29 NOTE — PROGRESS NOTES
1900-Pt transferred to s212. Pt wanting epidural eventually. Fentanyl given, see MAR.   -epidural placed without complication.   -amnioinfusion started. SVE 3-4/90/-2.  0220-SVE complete/+1. Started pushing   0300- of viable female, APGARs 8/9.   0530-pt up to bathroom with 2 person assist. Right leg is slightly tingly. Pt unable to void. Educated on needing to void within 6 hours of catheter removal. Bleeding has been WNL since delivery.  0545-transferred to  in stable condition. Report given to Nerissa BURKETT.

## 2020-01-29 NOTE — CARE PLAN
Problem: Pain Management  Goal: Pain level will decrease to patient's comfort goal  Outcome: PROGRESSING AS EXPECTED  Note:   Pt request pain meds as needed.     Problem: Altered physiologic condition related to immediate post-delivery state and potential for bleeding/hemorrhage  Goal: Patient physiologically stable as evidenced by normal lochia, palpable uterine involution and vital signs within normal limits  Outcome: PROGRESSING AS EXPECTED  Note:   Fundus firm and lochia light.

## 2020-01-29 NOTE — ANESTHESIA QCDR
2019 [facility] Clinical Data Registry (for Quality Improvement)     Postoperative nausea/vomiting risk protocol (Adult = 18 yrs and Pediatric 3-17 yrs)- (430 and 463)  General inhalation anesthetic (NOT TIVA) with PONV risk factors: No  Provision of anti-emetic therapy with at least 2 different classes of agents: N/A  Patient DID NOT receive anti-emetic therapy and reason is documented in Medical Record: N/A    Multimodal Pain Management- (477)  Non-emergent surgery AND patient age >= 18: No  Use of Multimodal Pain Management, two or more drugs and/or interventions, NOT including systemic opioids:   Exception: Documented allergy to multiple classes of analgesics:     Smoking Abstinence (404)  Patient is current smoker (cigarette, pipe, e-cig, marijuanna): No  Elective Surgery:   Abstinence instructions provided prior to day of surgery:   Patient abstained from smoking on day of surgery:     Pre-Op Beta-Blocker in Isolated CABG (44)  Isolated CABG AND patient age >= 18: No  Beta-blocker admin within 24 hours of surgical incision:   Exception:of medical reason(s) for not administering beta blocker within 24 hours prior to surgical incision (e.g., not  indicated,other medical reason):     PACU assessment of acute postoperative pain prior to Anesthesia Care End- Applies to Patients Age = 18- (ABG7)  Initial PACU pain score is which of the following: < 7/10  Patient unable to report pain score: N/A    Post-anesthetic transfer of care checklist/protocol to PACU/ICU- (426 and 427)  Upon conclusion of case, patient transferred to which of the following locations: PACU/Non-ICU  Use of transfer checklist/protocol: Yes  Exclusion: Service Performed in Patient Hospital Room (and thus did not require transfer): N/A  Unplanned admission to ICU related to anesthesia service up through end of PACU care- (MD51)  Unplanned admission to ICU (not initially anticipated at anesthesia start time): No

## 2020-01-29 NOTE — PROGRESS NOTES
"LABOR AND DELIVERY TRIAGE PROGRESS NOTE    PATIENT ID:  NAME:  Mia Awad  MRN:               [id number]  YOB: 2000     20 y.o. female  at 39w6d.    Subjective: Patient is a 21 yo  at 39w6d presenting with contractions every 3-5 minutes. She was seen in clinic today and had her membranes striped. She reports increasing contractions since. Her cervical exam was 1/80/0.    positive  For CTXS.   negative Feels pain   negative for LOF  negative for vaginal bleeding.   positive for fetal movement    ROS: Patient denies any fever chills, nausea, vomiting, headache, chest pain, shortness of breath, or dysuria or unusual swelling of hands or feet.     Objective:    Vitals:    20 1624 20 1646 20 1652   BP: 126/72     Pulse: 90     Resp:  16    Temp:  37.1 °C (98.8 °F)    TempSrc:  Temporal    SpO2:  98%    Weight:  76.7 kg (169 lb)    Height:   1.575 m (5' 2\")     Temp (24hrs), Av.1 °C (98.8 °F), Min:37.1 °C (98.8 °F), Max:37.1 °C (98.8 °F)    General: No acute distress, resting comfortably in bed.  HEENT: normocephalic, nontraumatic, PERRLA, EOMI  Cardiovascular: Heart RRR with no murmurs, rubs or gallops. Distal Pulses 2+  Respiratory: symmetric chest expansion, lungs CTAB, with no wheezes, rales, rhonci  Abdomen: gravid, nontender  Musculoskeletal: strength 5/5 in four extremities  Neuro: non focal with no numbness, tingling or changes in sensation    Cervix:  1cm/80%/-1   Deport: Uterine Contractions Q3-5 minutes.   FHRM: Baseline 155, Accels to 175, no decels, moderate variability    Assessment: 20 y.o. female  at 39w6d.    Plan:   # IUP at 39+6  - Patient is in early latent labor  - Discharge with return precautions of intense contractions every 3-5 minutes that are constant for >1 hour.   - NST  - IOL is scheduled for 20 if patient has not delivered by then    Discussed case with Dr. Puga, TPN Attending. Case was discussed and attending agreed with plan " prior to discharge of patient.    Ganga Whitlock M.D. PGY-1

## 2020-01-30 VITALS
WEIGHT: 169 LBS | DIASTOLIC BLOOD PRESSURE: 73 MMHG | SYSTOLIC BLOOD PRESSURE: 109 MMHG | OXYGEN SATURATION: 94 % | BODY MASS INDEX: 31.1 KG/M2 | HEART RATE: 84 BPM | HEIGHT: 62 IN | TEMPERATURE: 98.2 F | RESPIRATION RATE: 19 BRPM

## 2020-01-30 PROCEDURE — 700102 HCHG RX REV CODE 250 W/ 637 OVERRIDE(OP): Performed by: NURSE PRACTITIONER

## 2020-01-30 PROCEDURE — 700102 HCHG RX REV CODE 250 W/ 637 OVERRIDE(OP): Performed by: OBSTETRICS & GYNECOLOGY

## 2020-01-30 PROCEDURE — A9270 NON-COVERED ITEM OR SERVICE: HCPCS | Performed by: NURSE PRACTITIONER

## 2020-01-30 PROCEDURE — A9270 NON-COVERED ITEM OR SERVICE: HCPCS | Performed by: OBSTETRICS & GYNECOLOGY

## 2020-01-30 RX ORDER — DOCUSATE SODIUM 100 MG/1
100 CAPSULE, LIQUID FILLED ORAL 2 TIMES DAILY
Qty: 60 CAP | Refills: 0 | Status: ON HOLD | OUTPATIENT
Start: 2020-01-30 | End: 2022-04-15 | Stop reason: SDUPTHER

## 2020-01-30 RX ORDER — IBUPROFEN 600 MG/1
600 TABLET ORAL EVERY 6 HOURS PRN
Qty: 30 TAB | Refills: 0 | Status: ON HOLD | OUTPATIENT
Start: 2020-01-30 | End: 2022-04-15

## 2020-01-30 RX ADMIN — IBUPROFEN 600 MG: 600 TABLET ORAL at 00:01

## 2020-01-30 RX ADMIN — VITAMIN A, VITAMIN C, VITAMIN D-3, VITAMIN E, VITAMIN B-1, VITAMIN B-2, NIACIN, VITAMIN B-6, CALCIUM, IRON, ZINC, COPPER 1 TABLET: 4000; 120; 400; 22; 1.84; 3; 20; 10; 1; 12; 200; 27; 25; 2 TABLET ORAL at 06:12

## 2020-01-30 NOTE — LACTATION NOTE
Mother reports infant is rooting more at breast now but is having trouble staying on the breast. Mother is doing feeding plan including practicing latch first then pumping and supplementation per supplemental feeding volume guidelines every 2.5-3 hours. Provided mother paper work for hospital grade [place] pump rental. Plan for assist at afternoon feeding.

## 2020-01-30 NOTE — DISCHARGE INSTRUCTIONS
POSTPARTUM DISCHARGE INSTRUCTIONS FOR MOM    YOB: 2000   Age: 20 y.o.               Admit Date: 1/28/2020     Discharge Date: 1/30/2020  Attending Doctor:  Stacie Puga D.O.                  Allergies:  Patient has no known allergies.    Discharged to home by car. Discharged via wheelchair, hospital escort: Yes.  Special equipment needed: Not Applicable  Belongings with: Personal  Be sure to schedule a follow-up appointment with your primary care doctor or any specialists as instructed.     Discharge Plan:   Diet Plan: Discussed  Activity Level: Discussed  Confirmed Follow up Appointment: Patient to Call and Schedule Appointment  Confirmed Symptoms Management: Discussed  Medication Reconciliation Updated: Yes  Influenza Vaccine Indication: Patient Refuses    REASONS TO CALL YOUR OBSTETRICIAN:  1.   Persistent fever or shaking chills (Temperature higher than 100.4)  2.   Heavy bleeding (soaking more than 1 pad per hour); Passing clots  3.   Foul odor from vagina  4.   Mastitis (Breast infection; breast pain, chills, fever, redness)  5.   Urinary pain, burning or frequency  6.   Episiotomy infection  7.   Abdominal incision infection  8.   Severe depression longer than 24 hours    HAND WASHING  · Prior to handling the baby.  · Before breastfeeding or bottle feeding baby.  · After using the bathroom or changing the baby's diaper.    WOUND CARE  Ask your physician for additional care instructions.  In general:      VAGINAL CARE  · Nothing inside vagina for 6 weeks: no sexual intercourse, tampons or douching.  · Bleeding may continue for 2-4 weeks.  Amount may vary.    · Call your physician for heavy bleeding which means soaking more than 1 pad per hour    BIRTH CONTROL  · It is possible to become pregnant at any time after delivery and while breastfeeding.  · Plan to discuss a method of birth control with your physician at your follow up visit. visit.    DIET AND ELIMINATION  · Eating more fiber (bran  "cereal, fruits, and vegetables) and drinking plenty of fluids will help to avoid constipation.  · Urinary frequency after childbirth is normal.    POSTPARTUM BLUES  During the first few days after birth, you may experience a sense of the \"blues\" which may include impatience, irritability or even crying.  These feeling come and go quickly.  However, as many as 1 in 10 women experience emotional symptoms known as postpartum depression.    Postpartum depression:  May start as early as the second or third day after delivery or take several weeks or months to develop.  Symptoms of \"blues\" are present, but are more intense:  Crying spells; loss of appetite; feelings of hopelessness or loss of control; fear of touching the baby; over concern or no concern at all about the baby; little or no concern about your own appearance/caring for yourself; and/or inability to sleep or excessive sleeping.  Contact your physician if you are experiencing any of these symptoms.    Crisis Hotline:  · [place] [address] Crisis Hotline:  6-215-QNIBYFZ  Or 1-354.967.3898  · [place] Crisis Hotline:  1-602.633.7811  Or 884-724-0438    PREVENTING SHAKEN BABY:  If you are angry or stressed, PUT THE BABY IN THE CRIB, step away, take some deep breaths, and wait until you are calm to care for the baby.  DO NOT SHAKE THE BABY.  You are not alone, call a supporter for help.    · Crisis Call Center 24/7 crisis line 439-086-0819 or 1-119.746.6343  · You can also text them, text \"ANSWER\" to 956603    QUIT SMOKING/TOBACCO USE:  I understand the use of any tobacco products increases my chance of suffering from future heart disease and could cause other illnesses which may shorten my life. Quitting the use of tobacco products is the single most important thing I can do to improve my health. For further information on smoking / tobacco cessation call a Toll Free Quit Line at 1-677.355.7762 (*[facility]) or 1-750.589.4112 (American Lung Association) or " you can access the web based program at www.lungusa.org.    · [place] Tobacco Users Help Line:  (758) 964-8602       Toll Free: 1-700.909.2188  · Quit Tobacco Program [facility] (052)432-5400    DEPRESSION / SUICIDE RISK:  As you are discharged from this [facility], it is important to learn how to keep safe from harming yourself.    Recognize the warning signs:  · Abrupt changes in personality, positive or negative- including increase in energy   · Giving away possessions  · Change in eating patterns- significant weight changes-  positive or negative  · Change in sleeping patterns- unable to sleep or sleeping all the time   · Unwillingness or inability to communicate  · Depression  · Unusual sadness, discouragement and loneliness  · Talk of wanting to die  · Neglect of personal appearance   · Rebelliousness- reckless behavior  · Withdrawal from people/activities they love  · Confusion- inability to concentrate     If you or a loved one observes any of these behaviors or has concerns about self-harm, here's what you can do:  · Talk about it- your feelings and reasons for harming yourself  · Remove any means that you might use to hurt yourself (examples: pills, rope, extension cords, firearm)  · Get professional help from the community (Mental Health, Substance Abuse, psychological counseling)  · Do not be alone:Call your Safe Contact- someone whom you trust who will be there for you.  · Call your local CRISIS HOTLINE 024-9233 or 670-430-5067  · Call your local Children's Mobile Crisis Response Team [place] (078) 137-9021 or www.[employer]  · Call the toll free National Suicide Prevention Hotlines   · National Suicide Prevention Lifeline 849-665-DNXM (2256)  · National Hope Line Network 800-SUICIDE (665-5401)    DISCHARGE SURVEY:  Thank you for choosing [facility].  We hope we provided you with very good care.  You may be receiving a survey in the mail.  Please fill it  out.  Your opinion is valuable to us.    ADDITIONAL EDUCATIONAL MATERIALS GIVEN TO PATIENT:        My signature on this form indicates that:  1.  I have reviewed and understand the above information  2.  My questions regarding this information have been answered to my satisfaction.  3.  I have formulated a plan with my discharge nurse to obtain my prescribed medication for home.

## 2020-01-30 NOTE — DISCHARGE SUMMARY
Discharge Summary:      Mia Awad    Admit Date:   2020  Discharge Date:  2020     Admitting diagnosis:  Pregnancy  Labor and delivery, indication for care  Discharge Diagnosis: Status post vaginal, spontaneous.  Pregnancy Complications: none  Tubal Ligation:  no        History:  History reviewed. No pertinent past medical history.  OB History    Para Term  AB Living   1 1 1     1   SAB TAB Ectopic Molar Multiple Live Births           0 1      # Outcome Date GA Lbr Shaan/2nd Weight Sex Delivery Anes PTL Lv   1 Term 20 40w0d / 00:40 2.89 kg (6 lb 5.9 oz) F Vag-Spont EPI N MARTIN        Patient has no known allergies.  Patient Active Problem List    Diagnosis Date Noted   • Supervision of normal first pregnancy, antepartum 2019        Hospital Course:   20 y.o. , now para 1, was admitted with the above mentioned diagnosis, underwent Active Labor, vaginal, spontaneous. Patient postpartum course was unremarkable, with progressive advancement in diet , ambulation and toleration of oral analgesia. Patient without complaints today and desires discharge.      Vitals:    20 0610 20 1000 20 1400 20 1815   BP: 116/78 121/79 100/65 106/70   Pulse: 72 74 87 90   Resp: 19 16 18 18   Temp: 36.1 °C (97 °F) 36.4 °C (97.5 °F) 36.6 °C (97.9 °F) 36.8 °C (98.3 °F)   TempSrc: Temporal Oral Oral Temporal   SpO2: 95% 94% 95% 96%   Weight:       Height:           Current Facility-Administered Medications   Medication Dose   • ondansetron (ZOFRAN ODT) dispertab 4 mg  4 mg    Or   • ondansetron (ZOFRAN) syringe/vial injection 4 mg  4 mg   • oxytocin (PITOCIN) infusion (for postpartum)   mL/hr   • ibuprofen (MOTRIN) tablet 600 mg  600 mg   • acetaminophen (TYLENOL) tablet 650 mg  650 mg   • LR infusion     • PRN oxytocin (PITOCIN) (20 Units/1000 mL) PRN for excessive uterine bleeding - See Admin Instr  125-999 mL/hr   • miSOPROStol (CYTOTEC) tablet 800 mcg  800  mcg   • methylergonovine (METHERGINE) injection 0.2 mg  0.2 mg   • magnesium hydroxide (MILK OF MAGNESIA) suspension 30 mL  30 mL   • prenatal plus vitamin (STUARTNATAL 1+1) 27-1 MG tablet 1 Tab  1 Tab   • oxytocin (PITOCIN) infusion (for induction)  0.5-20 nusrat-units/min   • fentaNYL (SUBLIMAZE) injection 100 mcg  100 mcg       Exam:  Breast Exam: negative  Abdomen: Abdomen soft, non-tender. BS normal. No masses,  No organomegaly  Fundus Non Tender: yes  Incision: none  Perineum: 2nd degree tear, repaired  Extremity: extremities, peripheral pulses and reflexes normal     Labs:  Recent Labs     01/28/20  1900 01/29/20  1224   WBC 13.7* 16.2*   RBC 4.46 4.10*   HEMOGLOBIN 13.7 12.4   HEMATOCRIT 40.4 37.6   MCV 90.6 91.7   MCH 30.7 30.2   MCHC 33.9 33.0*   RDW 45.2 45.9   PLATELETCT 185 158*   MPV 11.6 11.5        Activity:   Discharge to home  Pelvic Rest x 6 weeks    Assessment:  normal postpartum course  Discharge Assessment: Taking in adequate diet and fluids, no heavy bleeding or foul smelling discharge, no redness or severe pain of breasts, voiding without difficulty, undecided on BCM.     Follow up: .[facility]  in 5 weeks for vaginal check.     Pt need to call or go to ED for any of the following:  Fever over 100.5  Severe abd pain  Severe pain or swelling of laceration or incinsion site  Red streaks or painful masses in the breasts  Foul smelling d/c or lochia  Heavy vaginal bleeding saturating a pad per hour  Sxs of PP depression     Discharge Meds:   Current Outpatient Medications   Medication Sig Dispense Refill   • ibuprofen (MOTRIN) 600 MG Tab Take 1 Tab by mouth every 6 hours as needed (For cramping after delivery; do not give if patient is receiving ketorolac (Toradol)). 30 Tab 0   • docusate sodium (COLACE) 100 MG Cap Take 1 Cap by mouth 2 times a day. 60 Cap 0       ASUNCION Akhtar

## 2020-01-30 NOTE — PROGRESS NOTES
Initiated pumping due to infant not latching well. Infant latched once on day shift and once on night shift. Per mom, okay to supplement with formula. Educated MOB on pump settings and when to pump, pt verbalized understanding. All questions answered at this time.

## 2020-01-30 NOTE — CONSULTS
Lactation note:  Initial visit. Per Mother, and RN infant not really latching. Only once on dayshift and once on night shift. Infant has been spitty earlier in the day, and despite spending time skin to skin, infant would be fussy at the breast. Discussed that infant may have some amniotic fluid remaining in belly, so hunger cues may not be seen. Discussed normal  feeding behaviors and normal course of breastfeeding at 12-24-48-72 hours, and what to expect. Discussed importance of offering breast with feeding cues or at least every 2-3 hours, and even if infant shows no interest, can do hand expression into infant's lips. Showed MOB how to hand express colostrum. Encouraged to continue doing skin to skin. Discussed signs of a deep latch, voiding and stooling patterns, feeding cues, stomach size, and importance of establishing milk supply with frequency of feedings.      RN assisted with latch attempt, football to the right breast. Mother is max assist. Infant did fuss at breast. LC calmed infant first, then placed in cross cradle to right breast. Had mother follow positioning. Wedged breast tissue and infant did latch and sustain suckles intermittently. Had to relatch a few times.      Mother would like to start supplementing with formula.   Plan for tonight is to continue to offer breast first, if not latching well, can hand express colostrum, refeed to infant. Then supplement per 10-20-30 supplementation guidelines. Encouraged to pump for 15 minutes afterwards. Mother agreeable to plan.   Encouraged her to continue to work on deep latch, and skin 2 skin, with hand expression.     Information and phone numbers to the [facility] provided & invited to breastfeeding circles.    MOB has no other questions or concerns regarding breastfeeding. Encouraged to call for assistance as needed.

## 2020-01-30 NOTE — PROGRESS NOTES
0700 Received bedside report from MADELAINE Multani. Discussed plan of care. Patient will call for pain medication as needed. All needs met at this time.

## 2020-01-30 NOTE — CARE PLAN
Problem: Pain Management  Goal: Pain level will decrease to patient's comfort goal  Outcome: PROGRESSING AS EXPECTED  Note:   Pt request pain medication as needed, will continue to monitor      Problem: Altered physiologic condition related to immediate post-delivery state and potential for bleeding/hemorrhage  Goal: Patient physiologically stable as evidenced by normal lochia, palpable uterine involution and vital signs within normal limits  Outcome: PROGRESSING AS EXPECTED  Note:   Fundus firm and lochia light.

## 2020-01-31 NOTE — LACTATION NOTE
Returned to assist with feeding as scheduled earlier with mother, mother declines assistance, reports she bottle fed formula. Does plan to rent pump. Reviewed supplemental feeding volume guidelines. Aware of outpatient resources.

## 2020-03-11 ENCOUNTER — POST PARTUM (OUTPATIENT)
Dept: OBGYN | Facility: CLINIC | Age: 20
End: 2020-03-11
Payer: MEDICAID

## 2020-03-11 VITALS — SYSTOLIC BLOOD PRESSURE: 118 MMHG | WEIGHT: 140 LBS | DIASTOLIC BLOOD PRESSURE: 78 MMHG | BODY MASS INDEX: 25.61 KG/M2

## 2020-03-11 DIAGNOSIS — Z30.014 ENCOUNTER FOR INITIAL PRESCRIPTION OF INTRAUTERINE CONTRACEPTIVE DEVICE (IUD): ICD-10-CM

## 2020-03-11 PROBLEM — Z34.00 SUPERVISION OF NORMAL FIRST PREGNANCY, ANTEPARTUM: Status: RESOLVED | Noted: 2019-06-03 | Resolved: 2020-03-11

## 2020-03-11 PROCEDURE — 0503F POSTPARTUM CARE VISIT: CPT | Performed by: NURSE PRACTITIONER

## 2020-03-11 ASSESSMENT — ENCOUNTER SYMPTOMS
NEUROLOGICAL NEGATIVE: 1
CONSTITUTIONAL NEGATIVE: 1
MUSCULOSKELETAL NEGATIVE: 1
CARDIOVASCULAR NEGATIVE: 1
GASTROINTESTINAL NEGATIVE: 1
RESPIRATORY NEGATIVE: 1
PSYCHIATRIC NEGATIVE: 1
EYES NEGATIVE: 1

## 2020-03-11 NOTE — PROGRESS NOTES
Pt here today for postpartum exam.  Delivery Date 1/29/2020   Currently: breast feeding   BCM: Pt would like to discuss Mirena IUD, [facility].   Good ph:[phone]  Pt states she would like to discuss some vaginal bleeding she is having   Chaperone offered and not indicated   Last PAP:2/20/2019,  RICARDOL

## 2020-03-11 NOTE — PROGRESS NOTES
Subjective:    Mia Awad is a 20 y.o. female who presents for her postpartum exam 6 weeks following  on 20 with a second degree laceration. Her prenatal course was uncomplicated. She denies dysuria, vaginal bleeding, odor, itching or breast problems. She is breastfeeding. She desires an Mirena for her birth control method. Reports no sex prior to this appointment. Eating a regular diet without difficulty. Bowel movement are Normal.  The patient is not having any pain. Spotting. Patient Denies Incisional pain, drainage or redness. Patient denies postpartum depression.   Review of Systems   Constitutional: Negative.    HENT: Negative.    Eyes: Negative.    Respiratory: Negative.    Cardiovascular: Negative.    Gastrointestinal: Negative.    Genitourinary: Negative.    Musculoskeletal: Negative.    Skin: Negative.    Neurological: Negative.    Endo/Heme/Allergies: Negative.    Psychiatric/Behavioral: Negative.    All other systems reviewed and are negative.      Problem List     Patient Active Problem List    Diagnosis Date Noted   • Supervision of normal first pregnancy, antepartum 2019       Objective  Physical Exam   Constitutional: She is oriented to person, place, and time and well-developed, well-nourished, and in no distress.   HENT:   Head: Normocephalic and atraumatic.   Nose: Nose normal.   Eyes: Pupils are equal, round, and reactive to light. Conjunctivae and EOM are normal.   Neck: Normal range of motion. Neck supple. No thyromegaly present.   Cardiovascular: Normal rate, regular rhythm, normal heart sounds and intact distal pulses.   Pulmonary/Chest: Effort normal and breath sounds normal.   Abdominal: Soft. Bowel sounds are normal. She exhibits no distension. There is no abdominal tenderness.   Musculoskeletal: Normal range of motion.   Neurological: She is alert and oriented to person, place, and time. Gait normal.   Skin: Skin is warm and dry.   Psychiatric: Mood, memory,  affect and judgment normal.   Nursing note and vitals reviewed.    EPDS: 4    See PE  Lab: H&H at d/c: 12.4/37.6  LMP 04/24/2019 (Exact Date)     Assessment:    1. PP care of lactating women   2. Exam WNL   3. Pap WNL on 2/20/19  4. Desires contraception       Plan:    1. Breastfeeding support   2. Continue PNV   3. Contraceptive counseling - Mirena order placed  4. Encouraged condom use until IUD placed  5. Discussed diet, exercise and resumption of sexual activity   6. Preconception guidance for next pregnancy if applicable. No risk factors. Folic acid for all women of childbearing age.

## 2021-07-28 ENCOUNTER — HOSPITAL ENCOUNTER (OUTPATIENT)
Dept: LAB | Facility: MEDICAL CENTER | Age: 21
End: 2021-07-28
Attending: OBSTETRICS & GYNECOLOGY
Payer: COMMERCIAL

## 2021-07-28 PROCEDURE — 87491 CHLMYD TRACH DNA AMP PROBE: CPT

## 2021-07-28 PROCEDURE — 88175 CYTOPATH C/V AUTO FLUID REDO: CPT

## 2021-07-28 PROCEDURE — 87591 N.GONORRHOEAE DNA AMP PROB: CPT

## 2021-07-29 LAB
C TRACH DNA GENITAL QL NAA+PROBE: NEGATIVE
CYTOLOGY REG CYTOL: NORMAL
N GONORRHOEA DNA GENITAL QL NAA+PROBE: NEGATIVE
SPECIMEN SOURCE: NORMAL

## 2021-08-19 ENCOUNTER — HOSPITAL ENCOUNTER (OUTPATIENT)
Facility: MEDICAL CENTER | Age: 21
End: 2021-08-19
Attending: OBSTETRICS & GYNECOLOGY
Payer: COMMERCIAL

## 2021-08-19 ENCOUNTER — HOSPITAL ENCOUNTER (OUTPATIENT)
Dept: LAB | Facility: MEDICAL CENTER | Age: 21
End: 2021-08-19
Attending: OBSTETRICS & GYNECOLOGY

## 2021-08-19 LAB
ABO GROUP BLD: NORMAL
BLD GP AB SCN SERPL QL: NORMAL
RH BLD: NORMAL

## 2021-08-19 PROCEDURE — 86900 BLOOD TYPING SEROLOGIC ABO: CPT

## 2021-08-19 PROCEDURE — 36415 COLL VENOUS BLD VENIPUNCTURE: CPT

## 2021-08-19 PROCEDURE — 86850 RBC ANTIBODY SCREEN: CPT

## 2021-08-19 PROCEDURE — 86901 BLOOD TYPING SEROLOGIC RH(D): CPT

## 2021-09-27 ENCOUNTER — HOSPITAL ENCOUNTER (OUTPATIENT)
Facility: MEDICAL CENTER | Age: 21
End: 2021-09-27
Attending: OBSTETRICS & GYNECOLOGY
Payer: COMMERCIAL

## 2021-09-27 ENCOUNTER — HOSPITAL ENCOUNTER (OUTPATIENT)
Dept: LAB | Facility: MEDICAL CENTER | Age: 21
End: 2021-09-27
Attending: OBSTETRICS & GYNECOLOGY

## 2021-09-27 LAB
ABO GROUP BLD: NORMAL
BASOPHILS # BLD AUTO: 0.6 % (ref 0–1.8)
BASOPHILS # BLD: 0.06 K/UL (ref 0–0.12)
BLD GP AB SCN SERPL QL: NORMAL
EOSINOPHIL # BLD AUTO: 0.06 K/UL (ref 0–0.51)
EOSINOPHIL NFR BLD: 0.6 % (ref 0–6.9)
ERYTHROCYTE [DISTWIDTH] IN BLOOD BY AUTOMATED COUNT: 41.9 FL (ref 35.9–50)
HBV SURFACE AG SER QL: NORMAL
HCT VFR BLD AUTO: 41.4 % (ref 37–47)
HCV AB SER QL: NORMAL
HGB BLD-MCNC: 14.6 G/DL (ref 12–16)
HIV 1+2 AB+HIV1 P24 AG SERPL QL IA: NORMAL
IMM GRANULOCYTES # BLD AUTO: 0.06 K/UL (ref 0–0.11)
IMM GRANULOCYTES NFR BLD AUTO: 0.6 % (ref 0–0.9)
LYMPHOCYTES # BLD AUTO: 2.52 K/UL (ref 1–4.8)
LYMPHOCYTES NFR BLD: 23.4 % (ref 22–41)
MCH RBC QN AUTO: 31.9 PG (ref 27–33)
MCHC RBC AUTO-ENTMCNC: 35.3 G/DL (ref 33.6–35)
MCV RBC AUTO: 90.4 FL (ref 81.4–97.8)
MONOCYTES # BLD AUTO: 0.51 K/UL (ref 0–0.85)
MONOCYTES NFR BLD AUTO: 4.7 % (ref 0–13.4)
NEUTROPHILS # BLD AUTO: 7.58 K/UL (ref 2–7.15)
NEUTROPHILS NFR BLD: 70.1 % (ref 44–72)
NRBC # BLD AUTO: 0 K/UL
NRBC BLD-RTO: 0 /100 WBC
PLATELET # BLD AUTO: 236 K/UL (ref 164–446)
PMV BLD AUTO: 10.6 FL (ref 9–12.9)
RBC # BLD AUTO: 4.58 M/UL (ref 4.2–5.4)
RH BLD: NORMAL
RUBV AB SER QL: 63.1 IU/ML
TREPONEMA PALLIDUM IGG+IGM AB [PRESENCE] IN SERUM OR PLASMA BY IMMUNOASSAY: NORMAL
WBC # BLD AUTO: 10.8 K/UL (ref 4.8–10.8)

## 2021-09-27 PROCEDURE — 86803 HEPATITIS C AB TEST: CPT

## 2021-09-27 PROCEDURE — 86901 BLOOD TYPING SEROLOGIC RH(D): CPT

## 2021-09-27 PROCEDURE — 86762 RUBELLA ANTIBODY: CPT

## 2021-09-27 PROCEDURE — 87340 HEPATITIS B SURFACE AG IA: CPT

## 2021-09-27 PROCEDURE — 86780 TREPONEMA PALLIDUM: CPT

## 2021-09-27 PROCEDURE — 86850 RBC ANTIBODY SCREEN: CPT

## 2021-09-27 PROCEDURE — 36415 COLL VENOUS BLD VENIPUNCTURE: CPT

## 2021-09-27 PROCEDURE — 86900 BLOOD TYPING SEROLOGIC ABO: CPT

## 2021-09-27 PROCEDURE — 87389 HIV-1 AG W/HIV-1&-2 AB AG IA: CPT

## 2021-09-27 PROCEDURE — 85025 COMPLETE CBC W/AUTO DIFF WBC: CPT

## 2021-09-27 PROCEDURE — 87086 URINE CULTURE/COLONY COUNT: CPT

## 2021-09-29 LAB
BACTERIA UR CULT: NORMAL
SIGNIFICANT IND 70042: NORMAL
SITE SITE: NORMAL
SOURCE SOURCE: NORMAL

## 2022-01-11 ENCOUNTER — HOSPITAL ENCOUNTER (OUTPATIENT)
Facility: MEDICAL CENTER | Age: 22
End: 2022-01-11
Attending: OBSTETRICS & GYNECOLOGY
Payer: COMMERCIAL

## 2022-01-11 ENCOUNTER — HOSPITAL ENCOUNTER (OUTPATIENT)
Dept: LAB | Facility: MEDICAL CENTER | Age: 22
End: 2022-01-11
Attending: OBSTETRICS & GYNECOLOGY

## 2022-01-11 LAB
GLUCOSE 1H P 50 G GLC PO SERPL-MCNC: 113 MG/DL (ref 70–139)
HCT VFR BLD AUTO: 36.4 % (ref 37–47)
HGB BLD-MCNC: 12.4 G/DL (ref 12–16)
PLATELET # BLD AUTO: 166 K/UL (ref 164–446)
TREPONEMA PALLIDUM IGG+IGM AB [PRESENCE] IN SERUM OR PLASMA BY IMMUNOASSAY: NORMAL

## 2022-01-11 PROCEDURE — 85014 HEMATOCRIT: CPT

## 2022-01-11 PROCEDURE — 36415 COLL VENOUS BLD VENIPUNCTURE: CPT

## 2022-01-11 PROCEDURE — 82950 GLUCOSE TEST: CPT

## 2022-01-11 PROCEDURE — 86780 TREPONEMA PALLIDUM: CPT

## 2022-01-11 PROCEDURE — 85049 AUTOMATED PLATELET COUNT: CPT

## 2022-01-11 PROCEDURE — 85018 HEMOGLOBIN: CPT

## 2022-03-04 ENCOUNTER — HOSPITAL ENCOUNTER (OUTPATIENT)
Dept: LAB | Facility: MEDICAL CENTER | Age: 22
End: 2022-03-04
Attending: OBSTETRICS & GYNECOLOGY
Payer: COMMERCIAL

## 2022-03-04 PROCEDURE — 87150 DNA/RNA AMPLIFIED PROBE: CPT

## 2022-03-04 PROCEDURE — 87081 CULTURE SCREEN ONLY: CPT

## 2022-03-05 LAB — GP B STREP DNA SPEC QL NAA+PROBE: NEGATIVE

## 2022-03-25 ENCOUNTER — HOSPITAL ENCOUNTER (EMERGENCY)
Facility: MEDICAL CENTER | Age: 22
End: 2022-03-25
Attending: OBSTETRICS & GYNECOLOGY | Admitting: OBSTETRICS & GYNECOLOGY
Payer: COMMERCIAL

## 2022-03-25 VITALS
HEIGHT: 62 IN | SYSTOLIC BLOOD PRESSURE: 102 MMHG | DIASTOLIC BLOOD PRESSURE: 56 MMHG | TEMPERATURE: 97.9 F | WEIGHT: 163 LBS | RESPIRATION RATE: 18 BRPM | HEART RATE: 86 BPM | OXYGEN SATURATION: 95 % | BODY MASS INDEX: 30 KG/M2

## 2022-03-25 LAB — CRYSTALS AMN MICRO: NORMAL

## 2022-03-25 PROCEDURE — 59025 FETAL NON-STRESS TEST: CPT

## 2022-03-25 PROCEDURE — 302449 STATCHG TRIAGE ONLY (STATISTIC)

## 2022-03-25 PROCEDURE — 89060 EXAM SYNOVIAL FLUID CRYSTALS: CPT

## 2022-03-25 ASSESSMENT — PAIN SCALES - GENERAL: PAINLEVEL: 0 - NO PAIN

## 2022-03-26 NOTE — PROGRESS NOTES
2020: Pt admitted to OB triage bed 2 for c/o ROM occurring approximately 1840 this evening of clear fluid.  Pt also report having same type of gush about a month ago and found to have BV.  +FM, -VB.  Pt assisted to bathroom to change for evaluation.      2044: Pt. Placed on EFM for fetal evaluation.    2108: Pt taken of EFM for SSE, -pool, fern collected for evaluation by lab.  SVE done closed/50%/-4    2210: Dr. Zamarripa called via cellphone, reported -morgan, closed on manual exam.  Pt okay to discharge home.    2225: Discharge instructions reviewed, all questions answered, pt verbalized understanding of instructions and agreeable to being discharged home.

## 2022-04-06 ENCOUNTER — HOSPITAL ENCOUNTER (EMERGENCY)
Facility: MEDICAL CENTER | Age: 22
End: 2022-04-07
Attending: OBSTETRICS & GYNECOLOGY | Admitting: OBSTETRICS & GYNECOLOGY
Payer: COMMERCIAL

## 2022-04-06 VITALS
TEMPERATURE: 98.3 F | WEIGHT: 160 LBS | HEIGHT: 62 IN | DIASTOLIC BLOOD PRESSURE: 62 MMHG | HEART RATE: 88 BPM | SYSTOLIC BLOOD PRESSURE: 110 MMHG | RESPIRATION RATE: 18 BRPM | BODY MASS INDEX: 29.44 KG/M2

## 2022-04-06 PROCEDURE — 59025 FETAL NON-STRESS TEST: CPT

## 2022-04-06 PROCEDURE — 302449 STATCHG TRIAGE ONLY (STATISTIC)

## 2022-04-06 ASSESSMENT — PAIN DESCRIPTION - PAIN TYPE: TYPE: ACUTE PAIN

## 2022-04-07 NOTE — PROGRESS NOTES
1900 - Report received from Jennifer BURKETT. Pt stable and POC discussed to monitor for 4 hours total.    1631 - Pt stable and NST reactive. No reports of contractions, vaginal discharge or vaginal bleeding. Pt walked out on Triage and stable. Education given as to when to call or return.
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
## 2022-04-07 NOTE — DISCHARGE INSTRUCTIONS
General Instructions:  · If you think you are in labor, time contractions (lying on your left side) from the beginning of one contraction to the beginning of the next contraction for at least one hour.  · Increase fluid intake: you should consume 10-12 8 oz glasses of non-caffeinated fluid per day.  · Report any pressure or burning on urination to your physician.  · Monitor fetal movement: If you notice an absence or decrease in fetal movement, drink a large glass of water and rest on your side.  If there is no increase in movement, call your physician or go to the hospital for further evaluation.  · Report any sudden, sharp abdominal pain.  · Report any bleeding.  Spotting or pinkish discharge is normal after vaginal exam.  You may also spot after sexual intercourse.      Other Instructions:  Please carefully review your entire AFTER VISIT SUMMARY document for all discharge instructions.      Signs and Symptoms of Labor  Labor is your body's natural process of moving your baby, placenta, and umbilical cord out of your uterus. The process of labor usually starts when your baby is full-term, between 37 and 40 weeks of pregnancy.  How will I know when I am close to going into labor?  As your body prepares for labor and the birth of your baby, you may notice the following symptoms in the weeks and days before true labor starts:  · Having a strong desire to get your home ready to receive your new baby. This is called nesting. Nesting may be a sign that labor is approaching, and it may occur several weeks before birth. Nesting may involve cleaning and organizing your home.  · Passing a small amount of thick, bloody mucus out of your vagina (normal bloody show or losing your mucus plug). This may happen more than a week before labor begins, or it might occur right before labor begins as the opening of the cervix starts to widen (dilate). For some women, the entire mucus plug passes at once. For others, smaller portions of  "the mucus plug may gradually pass over several days.  · Your baby moving (dropping) lower in your pelvis to get into position for birth (lightening). When this happens, you may feel more pressure on your bladder and pelvic bone and less pressure on your ribs. This may make it easier to breathe. It may also cause you to need to urinate more often and have problems with bowel movements.  · Having \"practice contractions\" ([place] contractions) that occur at irregular (unevenly spaced) intervals that are more than 10 minutes apart. This is also called false labor. False labor contractions are common after exercise or sexual activity, and they will stop if you change position, rest, or drink fluids. These contractions are usually mild and do not get stronger over time. They may feel like:  ? A backache or back pain.  ? Mild cramps, similar to menstrual cramps.  ? Tightening or pressure in your abdomen.  Other early symptoms that labor may be starting soon include:  · Nausea or loss of appetite.  · Diarrhea.  · Having a sudden burst of energy, or feeling very tired.  · Mood changes.  · Having trouble sleeping.  How will I know when labor has begun?  Signs that true labor has begun may include:  · Having contractions that come at regular (evenly spaced) intervals and increase in intensity. This may feel like more intense tightening or pressure in your abdomen that moves to your back.  ? Contractions may also feel like rhythmic pain in your upper thighs or back that comes and goes at regular intervals.  ? For first-time mothers, this change in intensity of contractions often occurs at a more gradual pace.  ? Women who have given birth before may notice a more rapid progression of contraction changes.  · Having a feeling of pressure in the vaginal area.  · Your water breaking (rupture of membranes). This is when the sac of fluid that surrounds your baby breaks. When this happens, you will notice fluid leaking from your " vagina. This may be clear or blood-tinged. Labor usually starts within 24 hours of your water breaking, but it may take longer to begin.  ? Some women notice this as a gush of fluid.  ? Others notice that their underwear repeatedly becomes damp.  Follow these instructions at home:    · When labor starts, or if your water breaks, call your health care provider or nurse care line. Based on your situation, they will determine when you should go in for an exam.  · When you are in early labor, you may be able to rest and manage symptoms at home. Some strategies to try at home include:  ? Breathing and relaxation techniques.  ? Taking a warm bath or shower.  ? Listening to music.  ? Using a heating pad on the lower back for pain. If you are directed to use heat:  § Place a towel between your skin and the heat source.  § Leave the heat on for 20-30 minutes.  § Remove the heat if your skin turns bright red. This is especially important if you are unable to feel pain, heat, or cold. You may have a greater risk of getting burned.  Get help right away if:  · You have painful, regular contractions that are 5 minutes apart or less.  · Labor starts before you are 37 weeks along in your pregnancy.  · You have a fever.  · You have a headache that does not go away.  · You have bright red blood coming from your vagina.  · You do not feel your baby moving.  · You have a sudden onset of:  ? Severe headache with vision problems.  ? Nausea, vomiting, or diarrhea.  ? Chest pain or shortness of breath.  These symptoms may be an emergency. If your health care provider recommends that you go to the hospital or birth center where you plan to deliver, do not drive yourself. Have someone else drive you, or call emergency services (911 in the [place]  Summary  · Labor is your body's natural process of moving your baby, placenta, and umbilical cord out of your uterus.  · The process of labor usually starts when your baby is full-term, between 37  and 40 weeks of pregnancy.  · When labor starts, or if your water breaks, call your health care provider or nurse care line. Based on your situation, they will determine when you should go in for an exam.  This information is not intended to replace advice given to you by your health care provider. Make sure you discuss any questions you have with your health care provider.  Document Released: 05/25/2018 Document Revised: 09/17/2018 Document Reviewed: 05/25/2018  Elsevier Patient Education © 2020 [employer]

## 2022-04-14 ENCOUNTER — HOSPITAL ENCOUNTER (INPATIENT)
Facility: MEDICAL CENTER | Age: 22
LOS: 1 days | End: 2022-04-15
Attending: OBSTETRICS & GYNECOLOGY | Admitting: OBSTETRICS & GYNECOLOGY
Payer: COMMERCIAL

## 2022-04-14 ENCOUNTER — ANESTHESIA (OUTPATIENT)
Dept: ANESTHESIOLOGY | Facility: MEDICAL CENTER | Age: 22
End: 2022-04-14
Payer: COMMERCIAL

## 2022-04-14 ENCOUNTER — ANESTHESIA EVENT (OUTPATIENT)
Dept: ANESTHESIOLOGY | Facility: MEDICAL CENTER | Age: 22
End: 2022-04-14
Payer: COMMERCIAL

## 2022-04-14 LAB
BASOPHILS # BLD AUTO: 0.3 % (ref 0–1.8)
BASOPHILS # BLD: 0.03 K/UL (ref 0–0.12)
CRYSTALS AMN MICRO: NORMAL
EOSINOPHIL # BLD AUTO: 0.03 K/UL (ref 0–0.51)
EOSINOPHIL NFR BLD: 0.3 % (ref 0–6.9)
ERYTHROCYTE [DISTWIDTH] IN BLOOD BY AUTOMATED COUNT: 43.8 FL (ref 35.9–50)
ERYTHROCYTE [DISTWIDTH] IN BLOOD BY AUTOMATED COUNT: 44 FL (ref 35.9–50)
HCT VFR BLD AUTO: 35.7 % (ref 37–47)
HCT VFR BLD AUTO: 37.5 % (ref 37–47)
HGB BLD-MCNC: 12.3 G/DL (ref 12–16)
HGB BLD-MCNC: 12.9 G/DL (ref 12–16)
HOLDING TUBE BB 8507: NORMAL
IMM GRANULOCYTES # BLD AUTO: 0.04 K/UL (ref 0–0.11)
IMM GRANULOCYTES NFR BLD AUTO: 0.4 % (ref 0–0.9)
LYMPHOCYTES # BLD AUTO: 1.67 K/UL (ref 1–4.8)
LYMPHOCYTES NFR BLD: 14.9 % (ref 22–41)
MCH RBC QN AUTO: 30.9 PG (ref 27–33)
MCH RBC QN AUTO: 31 PG (ref 27–33)
MCHC RBC AUTO-ENTMCNC: 34.4 G/DL (ref 33.6–35)
MCHC RBC AUTO-ENTMCNC: 34.5 G/DL (ref 33.6–35)
MCV RBC AUTO: 89.9 FL (ref 81.4–97.8)
MCV RBC AUTO: 89.9 FL (ref 81.4–97.8)
MONOCYTES # BLD AUTO: 0.69 K/UL (ref 0–0.85)
MONOCYTES NFR BLD AUTO: 6.2 % (ref 0–13.4)
NEUTROPHILS # BLD AUTO: 8.72 K/UL (ref 2–7.15)
NEUTROPHILS NFR BLD: 77.9 % (ref 44–72)
NRBC # BLD AUTO: 0 K/UL
NRBC BLD-RTO: 0 /100 WBC
PLATELET # BLD AUTO: 152 K/UL (ref 164–446)
PLATELET # BLD AUTO: 165 K/UL (ref 164–446)
PMV BLD AUTO: 10.8 FL (ref 9–12.9)
PMV BLD AUTO: 11.1 FL (ref 9–12.9)
RBC # BLD AUTO: 3.97 M/UL (ref 4.2–5.4)
RBC # BLD AUTO: 4.17 M/UL (ref 4.2–5.4)
WBC # BLD AUTO: 11.2 K/UL (ref 4.8–10.8)
WBC # BLD AUTO: 14.3 K/UL (ref 4.8–10.8)

## 2022-04-14 PROCEDURE — 700102 HCHG RX REV CODE 250 W/ 637 OVERRIDE(OP): Performed by: OBSTETRICS & GYNECOLOGY

## 2022-04-14 PROCEDURE — 85025 COMPLETE CBC W/AUTO DIFF WBC: CPT

## 2022-04-14 PROCEDURE — 59409 OBSTETRICAL CARE: CPT

## 2022-04-14 PROCEDURE — A9270 NON-COVERED ITEM OR SERVICE: HCPCS | Performed by: OBSTETRICS & GYNECOLOGY

## 2022-04-14 PROCEDURE — 700111 HCHG RX REV CODE 636 W/ 250 OVERRIDE (IP)

## 2022-04-14 PROCEDURE — 700101 HCHG RX REV CODE 250

## 2022-04-14 PROCEDURE — 304965 HCHG RECOVERY SERVICES

## 2022-04-14 PROCEDURE — 770002 HCHG ROOM/CARE - OB PRIVATE (112)

## 2022-04-14 PROCEDURE — 3E033VJ INTRODUCTION OF OTHER HORMONE INTO PERIPHERAL VEIN, PERCUTANEOUS APPROACH: ICD-10-PCS | Performed by: OBSTETRICS & GYNECOLOGY

## 2022-04-14 PROCEDURE — 303615 HCHG EPIDURAL/SPINAL ANESTHESIA FOR LABOR

## 2022-04-14 PROCEDURE — 36415 COLL VENOUS BLD VENIPUNCTURE: CPT

## 2022-04-14 PROCEDURE — 89060 EXAM SYNOVIAL FLUID CRYSTALS: CPT

## 2022-04-14 PROCEDURE — 700111 HCHG RX REV CODE 636 W/ 250 OVERRIDE (IP): Performed by: OBSTETRICS & GYNECOLOGY

## 2022-04-14 PROCEDURE — 10907ZC DRAINAGE OF AMNIOTIC FLUID, THERAPEUTIC FROM PRODUCTS OF CONCEPTION, VIA NATURAL OR ARTIFICIAL OPENING: ICD-10-PCS | Performed by: OBSTETRICS & GYNECOLOGY

## 2022-04-14 PROCEDURE — 700111 HCHG RX REV CODE 636 W/ 250 OVERRIDE (IP): Performed by: ANESTHESIOLOGY

## 2022-04-14 PROCEDURE — 99283 EMERGENCY DEPT VISIT LOW MDM: CPT

## 2022-04-14 PROCEDURE — 10H07YZ INSERTION OF OTHER DEVICE INTO PRODUCTS OF CONCEPTION, VIA NATURAL OR ARTIFICIAL OPENING: ICD-10-PCS | Performed by: OBSTETRICS & GYNECOLOGY

## 2022-04-14 PROCEDURE — 01967 NEURAXL LBR ANES VAG DLVR: CPT | Performed by: ANESTHESIOLOGY

## 2022-04-14 PROCEDURE — 0KQM0ZZ REPAIR PERINEUM MUSCLE, OPEN APPROACH: ICD-10-PCS | Performed by: OBSTETRICS & GYNECOLOGY

## 2022-04-14 PROCEDURE — 700105 HCHG RX REV CODE 258: Performed by: OBSTETRICS & GYNECOLOGY

## 2022-04-14 PROCEDURE — 85027 COMPLETE CBC AUTOMATED: CPT

## 2022-04-14 PROCEDURE — 700105 HCHG RX REV CODE 258

## 2022-04-14 RX ORDER — PHYTONADIONE 2 MG/ML
1 INJECTION, EMULSION INTRAMUSCULAR; INTRAVENOUS; SUBCUTANEOUS ONCE
Status: CANCELLED | OUTPATIENT
Start: 2022-04-14 | End: 2022-04-14

## 2022-04-14 RX ORDER — DEXTROSE, SODIUM CHLORIDE, SODIUM LACTATE, POTASSIUM CHLORIDE, AND CALCIUM CHLORIDE 5; .6; .31; .03; .02 G/100ML; G/100ML; G/100ML; G/100ML; G/100ML
INJECTION, SOLUTION INTRAVENOUS CONTINUOUS
Status: DISCONTINUED | OUTPATIENT
Start: 2022-04-14 | End: 2022-04-15 | Stop reason: HOSPADM

## 2022-04-14 RX ORDER — ALUMINA, MAGNESIA, AND SIMETHICONE 2400; 2400; 240 MG/30ML; MG/30ML; MG/30ML
30 SUSPENSION ORAL EVERY 6 HOURS PRN
Status: DISCONTINUED | OUTPATIENT
Start: 2022-04-14 | End: 2022-04-14 | Stop reason: HOSPADM

## 2022-04-14 RX ORDER — MISOPROSTOL 200 UG/1
800 TABLET ORAL
Status: DISCONTINUED | OUTPATIENT
Start: 2022-04-14 | End: 2022-04-14 | Stop reason: HOSPADM

## 2022-04-14 RX ORDER — IBUPROFEN 800 MG/1
800 TABLET ORAL EVERY 8 HOURS PRN
Status: DISCONTINUED | OUTPATIENT
Start: 2022-04-14 | End: 2022-04-15 | Stop reason: HOSPADM

## 2022-04-14 RX ORDER — METHYLERGONOVINE MALEATE 0.2 MG/ML
0.2 INJECTION INTRAVENOUS
Status: DISCONTINUED | OUTPATIENT
Start: 2022-04-14 | End: 2022-04-14 | Stop reason: HOSPADM

## 2022-04-14 RX ORDER — SODIUM CHLORIDE, SODIUM LACTATE, POTASSIUM CHLORIDE, AND CALCIUM CHLORIDE .6; .31; .03; .02 G/100ML; G/100ML; G/100ML; G/100ML
1000 INJECTION, SOLUTION INTRAVENOUS
Status: DISCONTINUED | OUTPATIENT
Start: 2022-04-14 | End: 2022-04-14 | Stop reason: HOSPADM

## 2022-04-14 RX ORDER — SODIUM CHLORIDE, SODIUM LACTATE, POTASSIUM CHLORIDE, CALCIUM CHLORIDE 600; 310; 30; 20 MG/100ML; MG/100ML; MG/100ML; MG/100ML
1000 INJECTION, SOLUTION INTRAVENOUS CONTINUOUS
Status: ACTIVE | OUTPATIENT
Start: 2022-04-14 | End: 2022-04-14

## 2022-04-14 RX ORDER — ONDANSETRON 2 MG/ML
4 INJECTION INTRAMUSCULAR; INTRAVENOUS EVERY 6 HOURS PRN
Status: DISCONTINUED | OUTPATIENT
Start: 2022-04-14 | End: 2022-04-14 | Stop reason: HOSPADM

## 2022-04-14 RX ORDER — SODIUM CHLORIDE, SODIUM LACTATE, POTASSIUM CHLORIDE, CALCIUM CHLORIDE 600; 310; 30; 20 MG/100ML; MG/100ML; MG/100ML; MG/100ML
INJECTION, SOLUTION INTRAVENOUS PRN
Status: DISCONTINUED | OUTPATIENT
Start: 2022-04-14 | End: 2022-04-15 | Stop reason: HOSPADM

## 2022-04-14 RX ORDER — ACETAMINOPHEN 500 MG
1000 TABLET ORAL
Status: DISCONTINUED | OUTPATIENT
Start: 2022-04-14 | End: 2022-04-14 | Stop reason: HOSPADM

## 2022-04-14 RX ORDER — CITRIC ACID/SODIUM CITRATE 334-500MG
30 SOLUTION, ORAL ORAL EVERY 6 HOURS PRN
Status: DISCONTINUED | OUTPATIENT
Start: 2022-04-14 | End: 2022-04-14 | Stop reason: HOSPADM

## 2022-04-14 RX ORDER — OXYCODONE HYDROCHLORIDE 5 MG/1
5 TABLET ORAL
Status: DISCONTINUED | OUTPATIENT
Start: 2022-04-14 | End: 2022-04-14 | Stop reason: HOSPADM

## 2022-04-14 RX ORDER — ERYTHROMYCIN 5 MG/G
OINTMENT OPHTHALMIC ONCE
Status: CANCELLED | OUTPATIENT
Start: 2022-04-14 | End: 2022-04-14

## 2022-04-14 RX ORDER — BUPIVACAINE HYDROCHLORIDE 2.5 MG/ML
INJECTION, SOLUTION EPIDURAL; INFILTRATION; INTRACAUDAL
Status: COMPLETED
Start: 2022-04-14 | End: 2022-04-14

## 2022-04-14 RX ORDER — ROPIVACAINE HYDROCHLORIDE 2 MG/ML
INJECTION, SOLUTION EPIDURAL; INFILTRATION; PERINEURAL
Status: COMPLETED
Start: 2022-04-14 | End: 2022-04-14

## 2022-04-14 RX ORDER — OXYCODONE HYDROCHLORIDE 5 MG/1
5 TABLET ORAL EVERY 4 HOURS PRN
Status: DISCONTINUED | OUTPATIENT
Start: 2022-04-14 | End: 2022-04-15 | Stop reason: HOSPADM

## 2022-04-14 RX ORDER — LIDOCAINE HYDROCHLORIDE 10 MG/ML
INJECTION, SOLUTION INFILTRATION; PERINEURAL
Status: COMPLETED
Start: 2022-04-14 | End: 2022-04-14

## 2022-04-14 RX ORDER — ROPIVACAINE HYDROCHLORIDE 2 MG/ML
INJECTION, SOLUTION EPIDURAL; INFILTRATION; PERINEURAL CONTINUOUS
Status: DISCONTINUED | OUTPATIENT
Start: 2022-04-14 | End: 2022-04-15 | Stop reason: HOSPADM

## 2022-04-14 RX ORDER — SODIUM CHLORIDE, SODIUM LACTATE, POTASSIUM CHLORIDE, AND CALCIUM CHLORIDE .6; .31; .03; .02 G/100ML; G/100ML; G/100ML; G/100ML
250 INJECTION, SOLUTION INTRAVENOUS PRN
Status: DISCONTINUED | OUTPATIENT
Start: 2022-04-14 | End: 2022-04-14 | Stop reason: HOSPADM

## 2022-04-14 RX ORDER — VITAMIN A ACETATE, BETA CAROTENE, ASCORBIC ACID, CHOLECALCIFEROL, .ALPHA.-TOCOPHEROL ACETATE, DL-, THIAMINE MONONITRATE, RIBOFLAVIN, NIACINAMIDE, PYRIDOXINE HYDROCHLORIDE, FOLIC ACID, CYANOCOBALAMIN, CALCIUM CARBONATE, FERROUS FUMARATE, ZINC OXIDE, CUPRIC OXIDE 3080; 12; 120; 400; 1; 1.84; 3; 20; 22; 920; 25; 200; 27; 10; 2 [IU]/1; UG/1; MG/1; [IU]/1; MG/1; MG/1; MG/1; MG/1; MG/1; [IU]/1; MG/1; MG/1; MG/1; MG/1; MG/1
1 TABLET, FILM COATED ORAL
Status: DISCONTINUED | OUTPATIENT
Start: 2022-04-15 | End: 2022-04-15 | Stop reason: HOSPADM

## 2022-04-14 RX ORDER — OXYTOCIN 10 [USP'U]/ML
10 INJECTION, SOLUTION INTRAMUSCULAR; INTRAVENOUS
Status: DISCONTINUED | OUTPATIENT
Start: 2022-04-14 | End: 2022-04-14 | Stop reason: HOSPADM

## 2022-04-14 RX ORDER — DOCUSATE SODIUM 100 MG/1
100 CAPSULE, LIQUID FILLED ORAL 2 TIMES DAILY PRN
Status: DISCONTINUED | OUTPATIENT
Start: 2022-04-14 | End: 2022-04-15 | Stop reason: HOSPADM

## 2022-04-14 RX ORDER — ACETAMINOPHEN 500 MG
1000 TABLET ORAL EVERY 6 HOURS PRN
Status: DISCONTINUED | OUTPATIENT
Start: 2022-04-14 | End: 2022-04-15 | Stop reason: HOSPADM

## 2022-04-14 RX ORDER — TERBUTALINE SULFATE 1 MG/ML
0.25 INJECTION, SOLUTION SUBCUTANEOUS
Status: DISCONTINUED | OUTPATIENT
Start: 2022-04-14 | End: 2022-04-14 | Stop reason: HOSPADM

## 2022-04-14 RX ORDER — IBUPROFEN 800 MG/1
800 TABLET ORAL
Status: COMPLETED | OUTPATIENT
Start: 2022-04-14 | End: 2022-04-14

## 2022-04-14 RX ORDER — ONDANSETRON 4 MG/1
4 TABLET, ORALLY DISINTEGRATING ORAL EVERY 6 HOURS PRN
Status: DISCONTINUED | OUTPATIENT
Start: 2022-04-14 | End: 2022-04-14 | Stop reason: HOSPADM

## 2022-04-14 RX ORDER — BUPIVACAINE HYDROCHLORIDE 2.5 MG/ML
INJECTION, SOLUTION EPIDURAL; INFILTRATION; INTRACAUDAL PRN
Status: DISCONTINUED | OUTPATIENT
Start: 2022-04-14 | End: 2022-04-14 | Stop reason: SURG

## 2022-04-14 RX ADMIN — FENTANYL CITRATE 100 MCG: 50 INJECTION, SOLUTION INTRAMUSCULAR; INTRAVENOUS at 06:21

## 2022-04-14 RX ADMIN — OXYTOCIN 125 ML/HR: 10 INJECTION, SOLUTION INTRAMUSCULAR; INTRAVENOUS at 12:54

## 2022-04-14 RX ADMIN — Medication 20 ML: at 10:19

## 2022-04-14 RX ADMIN — IBUPROFEN 800 MG: 800 TABLET, FILM COATED ORAL at 11:57

## 2022-04-14 RX ADMIN — ACETAMINOPHEN 1000 MG: 500 TABLET ORAL at 19:33

## 2022-04-14 RX ADMIN — SODIUM CHLORIDE, POTASSIUM CHLORIDE, SODIUM LACTATE AND CALCIUM CHLORIDE 1000 ML: 600; 310; 30; 20 INJECTION, SOLUTION INTRAVENOUS at 06:30

## 2022-04-14 RX ADMIN — BUPIVACAINE HYDROCHLORIDE 5 ML: 2.5 INJECTION, SOLUTION EPIDURAL; INFILTRATION; INTRACAUDAL; PERINEURAL at 06:21

## 2022-04-14 RX ADMIN — LIDOCAINE HYDROCHLORIDE 20 ML: 10 INJECTION, SOLUTION INFILTRATION; PERINEURAL at 10:19

## 2022-04-14 RX ADMIN — ROPIVACAINE HYDROCHLORIDE 100 MG: 2 INJECTION, SOLUTION EPIDURAL; INFILTRATION; PERINEURAL at 06:14

## 2022-04-14 RX ADMIN — ROPIVACAINE HYDROCHLORIDE 100 MG: 5 INJECTION, SOLUTION EPIDURAL; INFILTRATION; PERINEURAL at 06:14

## 2022-04-14 RX ADMIN — OXYTOCIN 2 MILLI-UNITS/MIN: 10 INJECTION, SOLUTION INTRAMUSCULAR; INTRAVENOUS at 07:41

## 2022-04-14 ASSESSMENT — LIFESTYLE VARIABLES
EVER_SMOKED: NEVER
ALCOHOL_USE: NO

## 2022-04-14 ASSESSMENT — EDINBURGH POSTNATAL DEPRESSION SCALE (EPDS)
I HAVE FELT SAD OR MISERABLE: NO, NOT AT ALL
I HAVE BEEN ABLE TO LAUGH AND SEE THE FUNNY SIDE OF THINGS: AS MUCH AS I ALWAYS COULD
I HAVE BEEN SO UNHAPPY THAT I HAVE BEEN CRYING: NO, NEVER
I HAVE BEEN SO UNHAPPY THAT I HAVE HAD DIFFICULTY SLEEPING: NOT AT ALL
I HAVE LOOKED FORWARD WITH ENJOYMENT TO THINGS: AS MUCH AS I EVER DID
I HAVE FELT SCARED OR PANICKY FOR NO GOOD REASON: NO, NOT AT ALL
I HAVE BEEN ANXIOUS OR WORRIED FOR NO GOOD REASON: NO, NOT AT ALL
THINGS HAVE BEEN GETTING ON TOP OF ME: NO, I HAVE BEEN COPING AS WELL AS EVER
I HAVE BLAMED MYSELF UNNECESSARILY WHEN THINGS WENT WRONG: NO, NEVER
THE THOUGHT OF HARMING MYSELF HAS OCCURRED TO ME: NEVER

## 2022-04-14 ASSESSMENT — PATIENT HEALTH QUESTIONNAIRE - PHQ9
1. LITTLE INTEREST OR PLEASURE IN DOING THINGS: NOT AT ALL
SUM OF ALL RESPONSES TO PHQ9 QUESTIONS 1 AND 2: 0
2. FEELING DOWN, DEPRESSED, IRRITABLE, OR HOPELESS: NOT AT ALL

## 2022-04-14 ASSESSMENT — PAIN DESCRIPTION - PAIN TYPE
TYPE: ACUTE PAIN
TYPE: ACUTE PAIN

## 2022-04-14 NOTE — PROGRESS NOTES
"0213 - Pt arrives with c/o contractions increasing in intensity since 10pm this evening. Pt seen in the office today and was 3cm per pt. EFM connected. Pt with strong palpable contractions. Pt with some LOF and \"pink-tinged\", +FM.   0230 - Speculum exam difficult due to pts intolerance to cervical exams, small amount of fluid noted and fern collected. SVE 3-4/80/-2.  0400 - 3;4/100/-2  0413 - MD West given report on pt, plans to come to bedside.   0500 - MD West at bedside, SVE 4/100/-2, admission orders obtained.   0507 - PIV placed, labs drawn, IV bolus started for epidural.   0515 - Report given to Stephanie.  "

## 2022-04-14 NOTE — L&D DELIVERY NOTE
DATE OF SERVICE:  2022     PROCEDURES:  1.  Epidural anesthesia.  2.  Amniotomy.  3.  Spontaneous vaginal delivery.  4.  Repair of second-degree perineal laceration.     OBSTETRICIAN:  Al West MD     DIAGNOSES:  1.  A 39 and 6/7th week gestation.  2.  Labor.     COMPLICATIONS:  None.     ESTIMATED BLOOD LOSS:  400 mL     BABY:  Male, 1 minute Apgar 8, 5 minute Apgar 9.  Weight 3535 grams     BRIEF HISTORY:  This is a  22-year-old lady is now .  She presented at   term with spontaneous labor and intact membranes.  She is group B strep   negative.  She received epidural anesthesia.  Amniotomy at 6 cm dilatation   produced clear fluid.  She progressed appropriately with low dose oxytocin   augmentation.  Fetal heart rate was category 1 to category 2.  Decelerations   were addressed with reduced oxytocin dose, amnioinfusion and oxygen.  There   was adequate fetal heart rate variability throughout.  The patient pushed   effectively.  She pushed the baby out occiput anterior in a controlled fashion   with no episiotomy.  I bulb suctioned the mouth and nares.  There were no   nuchal cords.  The shoulders and body delivered easily.  I placed the baby on   her chest and 2.5 minutes later, I doubly clamped and cut the cord.  The   placenta and all attached membranes delivered spontaneously in a timely fashion.    There was a 3-vessel cord with central insertion.  I repaired a jagged   midline second-degree perineal laceration with layered running 3-0 Vicryl   Suture, 1% lidocaine local anesthetic, and Betadine   prep.  Sponge   and needle counts were correct.        ______________________________  MD EUSEBIA Grijalva/MAKAYLA    DD:  2022 10:38  DT:  2022 10:56    Job#:  [id number]

## 2022-04-14 NOTE — ANESTHESIA PROCEDURE NOTES
Epidural Block    Date/Time: 4/14/2022 6:14 AM  Performed by: Zeke Cristina M.D.  Authorized by: Zeke Cristina M.D.     Patient Location:  OB  Start Time:  4/14/2022 6:14 AM  End Time:  4/14/2022 6:21 AM  Reason for Block: labor analgesia    patient identified, IV checked, site marked, risks and benefits discussed, surgical consent, monitors and equipment checked, pre-op evaluation and timeout performed    Patient Position:  Sitting  Prep: ChloraPrep, patient draped and sterile technique    Monitoring:  Blood pressure, continuous pulse oximetry and heart rate  Approach:  Midline  Location:  L4-L5  Injection Technique:  ERAN saline  Skin infiltration:  Lidocaine  Strength:  1%  Dose:  3ml  Needle Type:  Tuohy  Needle Gauge:  17 G  Needle Length:  3.5 in  Loss of resistance::  4.5  Catheter Size:  19 G  Catheter at Skin Depth:  10  Test Dose Result:  Negative   Easy x 1 attempt

## 2022-04-14 NOTE — ANESTHESIA POSTPROCEDURE EVALUATION
Patient: Mia Thurman    Procedure Summary     Date: 04/14/22 Room / Location:     Anesthesia Start: 0610 Anesthesia Stop: 1008    Procedure: Labor Epidural Diagnosis:     Scheduled Providers:  Responsible Provider: Chelly Barrow M.D.    Anesthesia Type: epidural ASA Status: 2          Final Anesthesia Type: epidural  Last vitals  BP   Blood Pressure: 100/59    Temp   36.2 °C (97.1 °F)    Pulse   81   Resp   18    SpO2   96 %      Anesthesia Post Evaluation    Patient location during evaluation: bedside  Patient participation: complete - patient participated  Level of consciousness: awake and alert    Airway patency: patent  Anesthetic complications: no  Cardiovascular status: hemodynamically stable  Respiratory status: acceptable  Hydration status: euvolemic    PONV: none          No complications documented.     Nurse Pain Score: 2 (NPRS)

## 2022-04-14 NOTE — ANESTHESIA TIME REPORT
Anesthesia Start and Stop Event Times     Date Time Event    4/14/2022 0610 Ready for Procedure     0610 Anesthesia Start     1008 Anesthesia Stop        Responsible Staff  04/14/22    Name Role Begin End    Zeke Cristina M.D. Anesth 0610 0700    Chelly Barrow M.D. Anesth 0700 1008        Overtime Reason:  no overtime (within assigned shift)    Comments:

## 2022-04-14 NOTE — L&D DELIVERY NOTE
dictated    Baby: male, APGARs 8-9, not weighed yet  plac spont,  cc  No epis  2nd degree lac, 3-0 vicryl, 1% lido

## 2022-04-14 NOTE — ANESTHESIA PREPROCEDURE EVALUATION
Date: 04/14/22  Procedure: Labor Epidural         Relevant Problems   ANESTHESIA (within normal limits)      PULMONARY (within normal limits)      NEURO (within normal limits)      CARDIAC (within normal limits)      GI (within normal limits)       (within normal limits)      ENDO (within normal limits)      OB (within normal limits)       Physical Exam    Airway   Mallampati: II  TM distance: >3 FB  Neck ROM: full       Cardiovascular - normal exam  Rhythm: regular  Rate: normal  (-) murmur     Dental - normal exam           Pulmonary - normal exam  Breath sounds clear to auscultation     Abdominal    Neurological - normal exam                 Anesthesia Plan    ASA 2       Plan - epidural   Neuraxial block will be labor analgesia                  Pertinent diagnostic labs and testing reviewed    Informed Consent:    Anesthetic plan and risks discussed with patient.

## 2022-04-14 NOTE — H&P
ADMISSION HISTORY AND PHYSICAL EXAM, LABOR AND DELIVERY    22 year old G 2  P 1    TERRIE 4/15/2022  EGA 39 6/7 wks    CC/HPI: Painful contractions since 22:00.    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 39 6/7 wkgestation  2. Labor  3. GBS negative    PLAN:  Admit.  Requests epidural. Augment labor PRN. Pursue delivery.

## 2022-04-14 NOTE — PROGRESS NOTES
0523: Bedside report received from Demi BURKETT. POC discussed.    0615: Dr. Crsitina Anesthesia at bedside for epidural.     0706: Dr. West at bedside. SVE completed by MD. AROM-clear. IUPC placed.     0830: Dr. West to bedside. SVE completed by MD.     0840: Dr. West at bedside, amnioinfusion bolus began    0910: Amnioinfusion bolus complete    0945: begin pushing    0950: Dr. West at bedside for delivery.     1008: , viable male infant APGARs 8/9     1240: Pt ambulated to bathroom to void, pt states she is dizzy and light headed. Assisted pt back to bed and repeat BP retake @ 1249- 106/53. Pt unable to void.     1330: Pt assisted to the bathroom, unable to void. Pt stated she would like to wait the 6 hours to see if she can void independently.     1410: Pt transferred to postpartum via wheelchair. Bedside report given to Renuka. Ora/ ID bands verified at bedside.

## 2022-04-14 NOTE — PROGRESS NOTES
Patient transferred from labor and delivery to room 323. Patient and  oriented to room and postpartum routine. Assessment done. Patient has no c/o pain. Lochia light  Fundus firm.patient has not been able to void after delivery and uterus is to the right. Patient requests 15 more minutes before trying again.patient aware catheter may need to be placed to empty bladder. IV site without redness, swelling or drainage.2nd bag pitocin placed on pump at 125/hr.cuddles alarm is active. Bands matched. Safety and security reviewed.

## 2022-04-15 VITALS
DIASTOLIC BLOOD PRESSURE: 56 MMHG | HEART RATE: 78 BPM | TEMPERATURE: 97.2 F | OXYGEN SATURATION: 98 % | HEIGHT: 62 IN | SYSTOLIC BLOOD PRESSURE: 103 MMHG | RESPIRATION RATE: 18 BRPM | BODY MASS INDEX: 31.1 KG/M2 | WEIGHT: 169 LBS

## 2022-04-15 PROCEDURE — A9270 NON-COVERED ITEM OR SERVICE: HCPCS | Performed by: OBSTETRICS & GYNECOLOGY

## 2022-04-15 PROCEDURE — 700102 HCHG RX REV CODE 250 W/ 637 OVERRIDE(OP): Performed by: OBSTETRICS & GYNECOLOGY

## 2022-04-15 RX ORDER — DOCUSATE SODIUM 100 MG/1
100 CAPSULE, LIQUID FILLED ORAL 2 TIMES DAILY PRN
COMMUNITY
Start: 2022-04-15

## 2022-04-15 RX ORDER — ACETAMINOPHEN 500 MG
500-1000 TABLET ORAL EVERY 6 HOURS PRN
Qty: 30 TABLET | Refills: 0 | COMMUNITY
Start: 2022-04-15

## 2022-04-15 RX ORDER — IBUPROFEN 200 MG
200-600 TABLET ORAL EVERY 6 HOURS PRN
COMMUNITY
Start: 2022-04-15

## 2022-04-15 RX ADMIN — PRENATAL WITH FERROUS FUM AND FOLIC ACID 1 TABLET: 3080; 920; 120; 400; 22; 1.84; 3; 20; 10; 1; 12; 200; 27; 25; 2 TABLET ORAL at 10:32

## 2022-04-15 RX ADMIN — IBUPROFEN 800 MG: 800 TABLET, FILM COATED ORAL at 05:52

## 2022-04-15 ASSESSMENT — PAIN DESCRIPTION - PAIN TYPE: TYPE: ACUTE PAIN

## 2022-04-15 NOTE — DISCHARGE INSTRUCTIONS
PATIENT DISCHARGE EDUCATION INSTRUCTION SHEET  REASONS TO CALL YOUR OBSTETRICIAN  · Persistent fever, shaking, chills (Temperature higher than 100.4) may indicate you have an infection  · Heavy bleeding: soaking more than 1 pad per hour; Passing clots an egg-sized clot or bigger may mean you have an postpartum hemorrhage  · Foul odor from vagina or bad smelling or discolored discharge or blood  · Breast infection (Mastitis symptoms); breast pain, chills, fever, redness or red streaks, may feel flu like symptoms  · Urinary pain, burning or frequency  · Incision that is not healing, increased redness, swelling, tenderness or pain, or any pus from episiotomy or  site may mean you have an infection  · Redness, swelling, warmth, or painful to touch in the calf area of your leg may mean you have a blood clot  · Severe or intensified depression, thoughts or feelings of wanting to hurt yourself or someone else   · Pain in chest, obstructed breathing or shortness of breath (trouble catching your breath) may mean you are having a postpartum complication. Call your provider immediately   · Headache that does not get better, even after taking medicine, a bad headache with vision changes or pain in the upper right area of your belly may mean you have high blood pressure or post birth preeclampsia. Call your provider immediately    HAND WASHING  All family and friends should wash their hands:  · Before and after holding the baby  · Before feeding the baby  · After using the restroom or changing the baby's diaper    WOUND CARE  Ask your physician for additional care instructions. In general:  ·  Incision:  · May shower and pat incision dry   · Keep the incision clean and dry  · There should not be any opening or pus from the incision  · Continue to walk at home 3 times a day   · Do NOT lift anything heavier than your baby (over 10 pounds)  · Encourage family to help participate in care of the  to allow  rest and mom time to heal  · Episiotomy/Laceration  · May use sarita-spray bottle, witch hazel pads and dermaplast spray for comfort  · Use sarita-spray bottle after urinating to cleanse perineal area  · To prevent burning during urination spray sarita-water bottle on labial area   · Pat perineal area dry until episiotomy/laceration is healed  · Continue to use sarita-bottle until bleeding stops as needed  · If have a 2nd degree laceration or greater, a Sitz bath can offer relief from soreness, burning, and inflammation   · Sitz Bath   · Sit in 6 inches of warm water and soak laceration as needed until the laceration heals    VAGINAL CARE AND BLEEDING  · Nothing inside vagina for 6 weeks:   · No sexual intercourse, tampons or douching  · Bleeding may continue for 2-4 weeks. Amount and color may vary  · Soaking 1 pad or more in an hour for several hours is considered heavy bleeding  · Passing large egg sized blood clots can be concerning  · If you feel like you have heavy bleeding or are having increasing amount of blood clots call your Obstetrician immediately  · If you begin feeling faint upon standing, feeling sick to your stomach, have clammy skin, a really fast heartbeat, have chills, start feeling confused, dizzy, sleepy or weak, or feeling like you're going to faint call your Obstetrician immediately    HYPERTENSION   Preeclampsia or gestational hypertension are types of high blood pressure that only pregnant women can get. It is important for you to be aware of symptoms to seek early intervention and treatment. If you have any of these symptoms immediately call your Obstetrician    · Vision changes or blurred vision   · Severe headache or pain that is unrelieved with medication and will not go away  · Persistent pain in upper abdomen or shoulder   · Increased swelling of face, feet, or hands  · Difficulty breathing or shortness of breath at rest  · Urinating less than usual    URINATION AND BOWEL MOVEMENTS  · Eating  "more fiber (bran cereal, fruits, and vegetables) and drinking plenty of fluids will help to avoid constipation  · Urinary frequency and urgency after childbirth is normal  · If you experience any urinary pain, burning or frequency call your provider    BIRTH CONTROL  · It is possible to become pregnant at any time after delivery and while breastfeeding  · Plan to discuss a method of birth control with your physician at your post delivery follow up visit    POSTPARTUM BLUES  During the first few days after birth, you may experience a sense of the \"blues\" which may include impatience, irritability or even crying. These feelings come and go quickly. However, as many as 1 in 10 women experience emotional symptoms known as postpartum depression.     POSTPARTUM DEPRESSION    May start as early as the second or third day after delivery or take several weeks or months to develop. Symptoms of \"blues\" are present, but are more intense: Crying spells; loss of appetite; feelings of hopelessness or loss of control; fear of touching the baby; over concern or no concern at all about the baby; little or no concern about your own appearance/caring for yourself; and/or inability to sleep or excessive sleeping. Contact your Obstetrician if you are experiencing any of these symptoms     PREVENTING SHAKEN BABY  If you are angry or stressed, PUT THE BABY IN THE CRIB, step away, take some deep breaths, and wait until you are calm to care for the baby. DO NOT SHAKE THE BABY. You are not alone, call a supporter for help.  · [facility] 24/7 crisis call line (340-696-5717) or (1-667.144.7696)  · You can also text them, text \"ANSWER\" (593224)      "

## 2022-04-15 NOTE — PROGRESS NOTES
0700- report received from NOC RN. POC discussed including feeding intervals, I+O documentation, latch support, lochia changes, ambulation, infant temperature management including STS and layering, weights, VS intervals, and discharge planning. Medications and other comfort measures discussed. Call light in reach.

## 2022-04-15 NOTE — LACTATION NOTE
"Mom is a 22 y/o P2 who delivered baby girl weighing 7 # 12.7 oz at 39.6 wks. Mom reports that she breast fed her first child for 18 months. Mom offered baby a supplement last night because \"her milk isn't in\"  LC discussed supply and demand and encouraged 8 or more feeds in 24 hours and avoiding supplements and pumping unless medically indicated. Mom is anticipating discharge this morning.   Mom states baby feeds about 15-20 minutes each feeding that is offered and offers only one breast at each feeding. Mom reports rhythmic jaw glide.  Reviewed demand feeds of 8 or more times in 24 hours, supply and demand, unswaddling baby before putting to breast, offering both breast each feed, skin to skin and observing for voids and stools. Encouraged mother to watch the [employer] hand expression video.      Mom has a breast pump at home and is enrolled in WIC.  LC provided resource referral sheet and supplemental feeding guidelines for mother if needed.   "

## 2022-04-15 NOTE — CARE PLAN
Problem: Knowledge Deficit - Postpartum  Goal: Patient will verbalize and demonstrate understanding of self and infant care  Outcome: Progressing     Problem: Psychosocial - Postpartum  Goal: Patient will verbalize and demonstrate effective bonding and parenting behavior  Outcome: Progressing     Problem: Altered Physiologic Condition  Goal: Patient physiologically stable as evidenced by normal lochia, palpable uterine involution and vitals within normal limits  Outcome: Progressing   The patient is hemodynamically stable    Shift Goals: pain controlled, ambulation, rest  Clinical Goals: maintain uterine good tone, prevent bladder distention  Patient Goals: pain controlled, ambulation, rest  Family Goals: rest    Progress made toward(s) clinical / shift goals:  pt verbalized acceptable level of pain    Patient is not progressing towards the following goals:

## 2022-04-15 NOTE — PROGRESS NOTES
POSTPARTUM DAY 1    No complaints.  Patient is doing well with infant care and lactation issues.  Patient is voiding well.    PE:    Afebrile  BP normal    Uterus involuting appropriately, nontender  Perineum:  No perineal complaints, so I didn't do specific perineal exam.  Calves nontender, Reynaldo negative bilaterally.     LAB:     4/14/2022 05:10 4/14/2022 22:28   WBC 11.2 (H) 14.3 (H)   Hemoglobin 12.9 12.3   Hematocrit 37.5 35.7 (L)   Platelets 165 152 (L)        PLAN:  Postpartum care.  Lactation consult.  Patient desires discharge:  today  .    Prescriptions:   PNV, OTC analgesics PRN .  Followup plans:   6 wks .

## 2022-04-15 NOTE — CARE PLAN
The patient is Stable - Low risk of patient condition declining or worsening    Shift Goals  Clinical Goals: patient able to void,pain well controlled    Progress made toward(s) clinical / shift goals: breastfeeding well, lochia light    Patient is not progressing towards the following goals:

## 2022-04-15 NOTE — PROGRESS NOTES
Discharge education provided and signed. All printed topics discussed. All questions answered.Cuddles verified and bands removed from infant. Car seat check performed. Pt escorted via wheelchair with all belongings.

## 2022-04-15 NOTE — PROGRESS NOTES
1933 Pt doing well bonding with, Assessment done with light bleeding, voiding without difficulty, Complained of mild abdominal pain medicated her as per doctor orders, Encourage to call for assistance, Needs attended.

## 2022-04-21 NOTE — ED PROVIDER NOTES
OB ED NOTE    22 year old G 2  P 1     TERRIE 4/15/2022  EGA 39 6/7 wks     CC/HPI: Painful contractions since 22:00.     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 39 6/7 wk gestation  2. Labor  3. GBS negative     PLAN:  Admit.  Requests epidural. Augment labor PRN. Pursue delivery.

## 2022-04-29 ENCOUNTER — HOSPITAL ENCOUNTER (OUTPATIENT)
Facility: MEDICAL CENTER | Age: 22
End: 2022-04-29
Attending: OBSTETRICS & GYNECOLOGY
Payer: COMMERCIAL

## 2022-04-29 PROCEDURE — 87076 CULTURE ANAEROBE IDENT EACH: CPT

## 2022-04-29 PROCEDURE — 87075 CULTR BACTERIA EXCEPT BLOOD: CPT

## 2022-04-29 PROCEDURE — 87070 CULTURE OTHR SPECIMN AEROBIC: CPT

## 2022-04-29 PROCEDURE — 87205 SMEAR GRAM STAIN: CPT

## 2022-04-30 LAB
GRAM STN SPEC: NORMAL
SIGNIFICANT IND 70042: NORMAL
SITE SITE: NORMAL
SOURCE SOURCE: NORMAL

## 2022-05-03 LAB
BACTERIA SPEC ANAEROBE CULT: ABNORMAL
BACTERIA SPEC ANAEROBE CULT: ABNORMAL
BACTERIA WND AEROBE CULT: NORMAL
GRAM STN SPEC: NORMAL
SIGNIFICANT IND 70042: ABNORMAL
SIGNIFICANT IND 70042: NORMAL
SITE SITE: ABNORMAL
SITE SITE: NORMAL
SOURCE SOURCE: ABNORMAL
SOURCE SOURCE: NORMAL

## 2022-09-22 ENCOUNTER — HOSPITAL ENCOUNTER (OUTPATIENT)
Dept: LAB | Facility: MEDICAL CENTER | Age: 22
End: 2022-09-22
Payer: COMMERCIAL

## 2022-09-22 PROCEDURE — 88175 CYTOPATH C/V AUTO FLUID REDO: CPT

## 2022-09-22 PROCEDURE — 87591 N.GONORRHOEAE DNA AMP PROB: CPT

## 2022-09-22 PROCEDURE — 87491 CHLMYD TRACH DNA AMP PROBE: CPT

## 2023-10-03 ENCOUNTER — HOSPITAL ENCOUNTER (OUTPATIENT)
Dept: LAB | Facility: MEDICAL CENTER | Age: 23
End: 2023-10-03
Payer: COMMERCIAL

## 2023-10-03 PROCEDURE — 87491 CHLMYD TRACH DNA AMP PROBE: CPT

## 2023-10-03 PROCEDURE — 87591 N.GONORRHOEAE DNA AMP PROB: CPT

## 2023-10-03 PROCEDURE — 88175 CYTOPATH C/V AUTO FLUID REDO: CPT

## 2023-10-05 LAB
C TRACH RRNA CVX QL NAA+PROBE: NEGATIVE
COMMENT NL11729A: NORMAL
CYTOLOGIST CVX/VAG CYTO: NORMAL
CYTOLOGY CVX/VAG DOC CYTO: NORMAL
CYTOLOGY CVX/VAG DOC THIN PREP: NORMAL
N GONORRHOEA RRNA CVX QL NAA+PROBE: NEGATIVE
NOTE NL11727A: NORMAL
OTHER STN SPEC: NORMAL
STAT OF ADQ CVX/VAG CYTO-IMP: NORMAL

## 2023-11-22 ENCOUNTER — HOSPITAL ENCOUNTER (OUTPATIENT)
Dept: LAB | Facility: MEDICAL CENTER | Age: 23
End: 2023-11-22
Attending: OBSTETRICS & GYNECOLOGY
Payer: COMMERCIAL

## 2023-11-22 LAB
ABO GROUP BLD: NORMAL
BASOPHILS # BLD AUTO: 0.4 % (ref 0–1.8)
BASOPHILS # BLD: 0.03 K/UL (ref 0–0.12)
BLD GP AB SCN SERPL QL: NORMAL
EOSINOPHIL # BLD AUTO: 0.02 K/UL (ref 0–0.51)
EOSINOPHIL NFR BLD: 0.3 % (ref 0–6.9)
ERYTHROCYTE [DISTWIDTH] IN BLOOD BY AUTOMATED COUNT: 42.2 FL (ref 35.9–50)
HBV SURFACE AG SER QL: NORMAL
HCT VFR BLD AUTO: 41.1 % (ref 37–47)
HCV AB SER QL: NORMAL
HGB BLD-MCNC: 14.1 G/DL (ref 12–16)
HIV 1+2 AB+HIV1 P24 AG SERPL QL IA: NORMAL
IMM GRANULOCYTES # BLD AUTO: 0.03 K/UL (ref 0–0.11)
IMM GRANULOCYTES NFR BLD AUTO: 0.4 % (ref 0–0.9)
LYMPHOCYTES # BLD AUTO: 1.81 K/UL (ref 1–4.8)
LYMPHOCYTES NFR BLD: 26 % (ref 22–41)
MCH RBC QN AUTO: 30.5 PG (ref 27–33)
MCHC RBC AUTO-ENTMCNC: 34.3 G/DL (ref 32.2–35.5)
MCV RBC AUTO: 89 FL (ref 81.4–97.8)
MONOCYTES # BLD AUTO: 0.57 K/UL (ref 0–0.85)
MONOCYTES NFR BLD AUTO: 8.2 % (ref 0–13.4)
NEUTROPHILS # BLD AUTO: 4.5 K/UL (ref 1.82–7.42)
NEUTROPHILS NFR BLD: 64.7 % (ref 44–72)
NRBC # BLD AUTO: 0 K/UL
NRBC BLD-RTO: 0 /100 WBC (ref 0–0.2)
PLATELET # BLD AUTO: 216 K/UL (ref 164–446)
PMV BLD AUTO: 10.3 FL (ref 9–12.9)
RBC # BLD AUTO: 4.62 M/UL (ref 4.2–5.4)
RH BLD: NORMAL
RUBV AB SER QL: 49.2 IU/ML
T PALLIDUM AB SER QL IA: NORMAL
WBC # BLD AUTO: 7 K/UL (ref 4.8–10.8)

## 2023-11-22 PROCEDURE — 87389 HIV-1 AG W/HIV-1&-2 AB AG IA: CPT

## 2023-11-22 PROCEDURE — 86850 RBC ANTIBODY SCREEN: CPT

## 2023-11-22 PROCEDURE — 86901 BLOOD TYPING SEROLOGIC RH(D): CPT

## 2023-11-22 PROCEDURE — 36415 COLL VENOUS BLD VENIPUNCTURE: CPT

## 2023-11-22 PROCEDURE — 87340 HEPATITIS B SURFACE AG IA: CPT

## 2023-11-22 PROCEDURE — 86762 RUBELLA ANTIBODY: CPT

## 2023-11-22 PROCEDURE — 86780 TREPONEMA PALLIDUM: CPT

## 2023-11-22 PROCEDURE — 86900 BLOOD TYPING SEROLOGIC ABO: CPT

## 2023-11-22 PROCEDURE — 86803 HEPATITIS C AB TEST: CPT

## 2023-11-22 PROCEDURE — 85025 COMPLETE CBC W/AUTO DIFF WBC: CPT

## 2023-11-22 PROCEDURE — 87086 URINE CULTURE/COLONY COUNT: CPT

## 2023-11-24 LAB
BACTERIA UR CULT: NORMAL
SIGNIFICANT IND 70042: NORMAL
SITE SITE: NORMAL
SOURCE SOURCE: NORMAL

## 2024-02-14 ENCOUNTER — HOSPITAL ENCOUNTER (OUTPATIENT)
Dept: LAB | Facility: MEDICAL CENTER | Age: 24
End: 2024-02-14
Attending: OBSTETRICS & GYNECOLOGY
Payer: COMMERCIAL

## 2024-02-14 LAB
GLUCOSE 1H P 50 G GLC PO SERPL-MCNC: 90 MG/DL (ref 70–139)
HCT VFR BLD AUTO: 36.9 % (ref 37–47)
HGB BLD-MCNC: 12.4 G/DL (ref 12–16)
PLATELET # BLD AUTO: 193 K/UL (ref 164–446)
T PALLIDUM AB SER QL IA: NORMAL

## 2024-02-14 PROCEDURE — 82950 GLUCOSE TEST: CPT

## 2024-02-14 PROCEDURE — 36415 COLL VENOUS BLD VENIPUNCTURE: CPT

## 2024-02-14 PROCEDURE — 85049 AUTOMATED PLATELET COUNT: CPT

## 2024-02-14 PROCEDURE — 86780 TREPONEMA PALLIDUM: CPT

## 2024-02-14 PROCEDURE — 85014 HEMATOCRIT: CPT

## 2024-02-14 PROCEDURE — 85018 HEMOGLOBIN: CPT

## 2024-05-07 ENCOUNTER — HOSPITAL ENCOUNTER (OUTPATIENT)
Facility: MEDICAL CENTER | Age: 24
End: 2024-05-07
Attending: OBSTETRICS & GYNECOLOGY
Payer: COMMERCIAL

## 2024-05-09 LAB — GP B STREP DNA SPEC QL NAA+PROBE: NEGATIVE

## 2024-06-15 ENCOUNTER — ANESTHESIA (OUTPATIENT)
Dept: ANESTHESIOLOGY | Facility: MEDICAL CENTER | Age: 24
End: 2024-06-15

## 2024-06-15 ENCOUNTER — HOSPITAL ENCOUNTER (INPATIENT)
Facility: MEDICAL CENTER | Age: 24
LOS: 1 days | End: 2024-06-16
Attending: OBSTETRICS & GYNECOLOGY | Admitting: OBSTETRICS & GYNECOLOGY

## 2024-06-15 ENCOUNTER — ANESTHESIA EVENT (OUTPATIENT)
Dept: ANESTHESIOLOGY | Facility: MEDICAL CENTER | Age: 24
End: 2024-06-15

## 2024-06-15 LAB
BASOPHILS # BLD AUTO: 0.3 % (ref 0–1.8)
BASOPHILS # BLD: 0.03 K/UL (ref 0–0.12)
EOSINOPHIL # BLD AUTO: 0.02 K/UL (ref 0–0.51)
EOSINOPHIL NFR BLD: 0.2 % (ref 0–6.9)
ERYTHROCYTE [DISTWIDTH] IN BLOOD BY AUTOMATED COUNT: 44.5 FL (ref 35.9–50)
HCT VFR BLD AUTO: 42.1 % (ref 37–47)
HGB BLD-MCNC: 14.1 G/DL (ref 12–16)
HOLDING TUBE BB 8507: NORMAL
IMM GRANULOCYTES # BLD AUTO: 0.05 K/UL (ref 0–0.11)
IMM GRANULOCYTES NFR BLD AUTO: 0.5 % (ref 0–0.9)
LYMPHOCYTES # BLD AUTO: 1.47 K/UL (ref 1–4.8)
LYMPHOCYTES NFR BLD: 13.2 % (ref 22–41)
MCH RBC QN AUTO: 29.9 PG (ref 27–33)
MCHC RBC AUTO-ENTMCNC: 33.5 G/DL (ref 32.2–35.5)
MCV RBC AUTO: 89.2 FL (ref 81.4–97.8)
MONOCYTES # BLD AUTO: 0.49 K/UL (ref 0–0.85)
MONOCYTES NFR BLD AUTO: 4.4 % (ref 0–13.4)
NEUTROPHILS # BLD AUTO: 9.05 K/UL (ref 1.82–7.42)
NEUTROPHILS NFR BLD: 81.4 % (ref 44–72)
NRBC # BLD AUTO: 0 K/UL
NRBC BLD-RTO: 0 /100 WBC (ref 0–0.2)
PLATELET # BLD AUTO: 154 K/UL (ref 164–446)
PMV BLD AUTO: 10.9 FL (ref 9–12.9)
RBC # BLD AUTO: 4.72 M/UL (ref 4.2–5.4)
T PALLIDUM AB SER QL IA: NORMAL
WBC # BLD AUTO: 11.1 K/UL (ref 4.8–10.8)

## 2024-06-15 PROCEDURE — 36415 COLL VENOUS BLD VENIPUNCTURE: CPT

## 2024-06-15 PROCEDURE — 10907ZC DRAINAGE OF AMNIOTIC FLUID, THERAPEUTIC FROM PRODUCTS OF CONCEPTION, VIA NATURAL OR ARTIFICIAL OPENING: ICD-10-PCS | Performed by: OBSTETRICS & GYNECOLOGY

## 2024-06-15 PROCEDURE — A9270 NON-COVERED ITEM OR SERVICE: HCPCS | Performed by: OBSTETRICS & GYNECOLOGY

## 2024-06-15 PROCEDURE — 700111 HCHG RX REV CODE 636 W/ 250 OVERRIDE (IP): Performed by: STUDENT IN AN ORGANIZED HEALTH CARE EDUCATION/TRAINING PROGRAM

## 2024-06-15 PROCEDURE — 700101 HCHG RX REV CODE 250: Performed by: STUDENT IN AN ORGANIZED HEALTH CARE EDUCATION/TRAINING PROGRAM

## 2024-06-15 PROCEDURE — 304965 HCHG RECOVERY SERVICES

## 2024-06-15 PROCEDURE — 700111 HCHG RX REV CODE 636 W/ 250 OVERRIDE (IP): Mod: JZ | Performed by: OBSTETRICS & GYNECOLOGY

## 2024-06-15 PROCEDURE — 59409 OBSTETRICAL CARE: CPT

## 2024-06-15 PROCEDURE — 700105 HCHG RX REV CODE 258: Performed by: STUDENT IN AN ORGANIZED HEALTH CARE EDUCATION/TRAINING PROGRAM

## 2024-06-15 PROCEDURE — 85025 COMPLETE CBC W/AUTO DIFF WBC: CPT

## 2024-06-15 PROCEDURE — 303615 HCHG EPIDURAL/SPINAL ANESTHESIA FOR LABOR

## 2024-06-15 PROCEDURE — 770002 HCHG ROOM/CARE - OB PRIVATE (112)

## 2024-06-15 PROCEDURE — 700102 HCHG RX REV CODE 250 W/ 637 OVERRIDE(OP): Performed by: OBSTETRICS & GYNECOLOGY

## 2024-06-15 PROCEDURE — 700105 HCHG RX REV CODE 258: Performed by: OBSTETRICS & GYNECOLOGY

## 2024-06-15 PROCEDURE — 0HQ9XZZ REPAIR PERINEUM SKIN, EXTERNAL APPROACH: ICD-10-PCS | Performed by: OBSTETRICS & GYNECOLOGY

## 2024-06-15 PROCEDURE — 700111 HCHG RX REV CODE 636 W/ 250 OVERRIDE (IP): Mod: JZ

## 2024-06-15 PROCEDURE — 86780 TREPONEMA PALLIDUM: CPT

## 2024-06-15 RX ORDER — EPHEDRINE SULFATE 50 MG/ML
5 INJECTION, SOLUTION INTRAVENOUS
Status: DISCONTINUED | OUTPATIENT
Start: 2024-06-15 | End: 2024-06-15 | Stop reason: HOSPADM

## 2024-06-15 RX ORDER — SIMETHICONE 125 MG
125 TABLET,CHEWABLE ORAL 4 TIMES DAILY PRN
Status: DISCONTINUED | OUTPATIENT
Start: 2024-06-15 | End: 2024-06-16 | Stop reason: HOSPADM

## 2024-06-15 RX ORDER — SODIUM CHLORIDE, SODIUM LACTATE, POTASSIUM CHLORIDE, AND CALCIUM CHLORIDE .6; .31; .03; .02 G/100ML; G/100ML; G/100ML; G/100ML
250 INJECTION, SOLUTION INTRAVENOUS PRN
Status: DISCONTINUED | OUTPATIENT
Start: 2024-06-15 | End: 2024-06-15 | Stop reason: HOSPADM

## 2024-06-15 RX ORDER — TERBUTALINE SULFATE 1 MG/ML
0.25 INJECTION, SOLUTION SUBCUTANEOUS
Status: DISCONTINUED | OUTPATIENT
Start: 2024-06-15 | End: 2024-06-15 | Stop reason: HOSPADM

## 2024-06-15 RX ORDER — SODIUM CHLORIDE, SODIUM LACTATE, POTASSIUM CHLORIDE, AND CALCIUM CHLORIDE .6; .31; .03; .02 G/100ML; G/100ML; G/100ML; G/100ML
1000 INJECTION, SOLUTION INTRAVENOUS
Status: COMPLETED | OUTPATIENT
Start: 2024-06-15 | End: 2024-06-15

## 2024-06-15 RX ORDER — OXYCODONE HYDROCHLORIDE 5 MG/1
5 TABLET ORAL EVERY 4 HOURS PRN
Status: DISCONTINUED | OUTPATIENT
Start: 2024-06-15 | End: 2024-06-16 | Stop reason: HOSPADM

## 2024-06-15 RX ORDER — ROPIVACAINE HYDROCHLORIDE 2 MG/ML
INJECTION, SOLUTION EPIDURAL; INFILTRATION; PERINEURAL CONTINUOUS
Status: DISCONTINUED | OUTPATIENT
Start: 2024-06-15 | End: 2024-06-16 | Stop reason: HOSPADM

## 2024-06-15 RX ORDER — IBUPROFEN 800 MG/1
800 TABLET, FILM COATED ORAL EVERY 8 HOURS PRN
Status: DISCONTINUED | OUTPATIENT
Start: 2024-06-15 | End: 2024-06-16 | Stop reason: HOSPADM

## 2024-06-15 RX ORDER — VITAMIN A ACETATE, BETA CAROTENE, ASCORBIC ACID, CHOLECALCIFEROL, .ALPHA.-TOCOPHEROL ACETATE, DL-, THIAMINE MONONITRATE, RIBOFLAVIN, NIACINAMIDE, PYRIDOXINE HYDROCHLORIDE, FOLIC ACID, CYANOCOBALAMIN, CALCIUM CARBONATE, FERROUS FUMARATE, ZINC OXIDE, CUPRIC OXIDE 3080; 12; 120; 400; 1; 1.84; 3; 20; 22; 920; 25; 200; 27; 10; 2 [IU]/1; UG/1; MG/1; [IU]/1; MG/1; MG/1; MG/1; MG/1; MG/1; [IU]/1; MG/1; MG/1; MG/1; MG/1; MG/1
1 TABLET, FILM COATED ORAL
Status: DISCONTINUED | OUTPATIENT
Start: 2024-06-15 | End: 2024-06-16 | Stop reason: HOSPADM

## 2024-06-15 RX ORDER — BUPIVACAINE HYDROCHLORIDE 2.5 MG/ML
INJECTION, SOLUTION EPIDURAL; INFILTRATION; INTRACAUDAL
Status: COMPLETED | OUTPATIENT
Start: 2024-06-15 | End: 2024-06-15

## 2024-06-15 RX ORDER — BUPIVACAINE HYDROCHLORIDE 2.5 MG/ML
INJECTION, SOLUTION EPIDURAL; INFILTRATION; INTRACAUDAL
Status: COMPLETED
Start: 2024-06-15 | End: 2024-06-15

## 2024-06-15 RX ORDER — ROPIVACAINE HYDROCHLORIDE 2 MG/ML
INJECTION, SOLUTION EPIDURAL; INFILTRATION; PERINEURAL
Status: COMPLETED
Start: 2024-06-15 | End: 2024-06-15

## 2024-06-15 RX ORDER — DOCUSATE SODIUM 100 MG/1
100 CAPSULE, LIQUID FILLED ORAL 2 TIMES DAILY PRN
Status: DISCONTINUED | OUTPATIENT
Start: 2024-06-15 | End: 2024-06-16 | Stop reason: HOSPADM

## 2024-06-15 RX ORDER — ONDANSETRON 4 MG/1
4 TABLET, ORALLY DISINTEGRATING ORAL EVERY 6 HOURS PRN
Status: DISCONTINUED | OUTPATIENT
Start: 2024-06-15 | End: 2024-06-15 | Stop reason: HOSPADM

## 2024-06-15 RX ORDER — SODIUM CHLORIDE, SODIUM LACTATE, POTASSIUM CHLORIDE, CALCIUM CHLORIDE 600; 310; 30; 20 MG/100ML; MG/100ML; MG/100ML; MG/100ML
INJECTION, SOLUTION INTRAVENOUS CONTINUOUS
Status: DISCONTINUED | OUTPATIENT
Start: 2024-06-15 | End: 2024-06-16 | Stop reason: HOSPADM

## 2024-06-15 RX ORDER — LIDOCAINE HYDROCHLORIDE 10 MG/ML
20 INJECTION, SOLUTION INFILTRATION; PERINEURAL
Status: DISCONTINUED | OUTPATIENT
Start: 2024-06-15 | End: 2024-06-15 | Stop reason: HOSPADM

## 2024-06-15 RX ORDER — ACETAMINOPHEN 500 MG
1000 TABLET ORAL EVERY 6 HOURS PRN
Status: DISCONTINUED | OUTPATIENT
Start: 2024-06-15 | End: 2024-06-16 | Stop reason: HOSPADM

## 2024-06-15 RX ORDER — LIDOCAINE HYDROCHLORIDE AND EPINEPHRINE 15; 5 MG/ML; UG/ML
INJECTION, SOLUTION EPIDURAL
Status: COMPLETED | OUTPATIENT
Start: 2024-06-15 | End: 2024-06-15

## 2024-06-15 RX ORDER — BISACODYL 10 MG
10 SUPPOSITORY, RECTAL RECTAL PRN
Status: DISCONTINUED | OUTPATIENT
Start: 2024-06-15 | End: 2024-06-16 | Stop reason: HOSPADM

## 2024-06-15 RX ORDER — OXYTOCIN 10 [USP'U]/ML
10 INJECTION, SOLUTION INTRAMUSCULAR; INTRAVENOUS
Status: DISCONTINUED | OUTPATIENT
Start: 2024-06-15 | End: 2024-06-15 | Stop reason: HOSPADM

## 2024-06-15 RX ORDER — CARBOPROST TROMETHAMINE 250 UG/ML
250 INJECTION, SOLUTION INTRAMUSCULAR
Status: DISCONTINUED | OUTPATIENT
Start: 2024-06-15 | End: 2024-06-16 | Stop reason: HOSPADM

## 2024-06-15 RX ORDER — ONDANSETRON 2 MG/ML
4 INJECTION INTRAMUSCULAR; INTRAVENOUS EVERY 6 HOURS PRN
Status: DISCONTINUED | OUTPATIENT
Start: 2024-06-15 | End: 2024-06-15 | Stop reason: HOSPADM

## 2024-06-15 RX ORDER — ACETAMINOPHEN 500 MG
1000 TABLET ORAL
Status: DISCONTINUED | OUTPATIENT
Start: 2024-06-15 | End: 2024-06-15 | Stop reason: HOSPADM

## 2024-06-15 RX ORDER — IBUPROFEN 800 MG/1
800 TABLET, FILM COATED ORAL
Status: COMPLETED | OUTPATIENT
Start: 2024-06-15 | End: 2024-06-15

## 2024-06-15 RX ORDER — SODIUM CHLORIDE, SODIUM LACTATE, POTASSIUM CHLORIDE, CALCIUM CHLORIDE 600; 310; 30; 20 MG/100ML; MG/100ML; MG/100ML; MG/100ML
INJECTION, SOLUTION INTRAVENOUS PRN
Status: DISCONTINUED | OUTPATIENT
Start: 2024-06-15 | End: 2024-06-16 | Stop reason: HOSPADM

## 2024-06-15 RX ORDER — METHYLERGONOVINE MALEATE 0.2 MG/ML
0.2 INJECTION INTRAVENOUS
Status: DISCONTINUED | OUTPATIENT
Start: 2024-06-15 | End: 2024-06-16 | Stop reason: HOSPADM

## 2024-06-15 RX ORDER — CALCIUM CARBONATE 500 MG/1
1000 TABLET, CHEWABLE ORAL EVERY 6 HOURS PRN
Status: DISCONTINUED | OUTPATIENT
Start: 2024-06-15 | End: 2024-06-16 | Stop reason: HOSPADM

## 2024-06-15 RX ORDER — MISOPROSTOL 200 UG/1
800 TABLET ORAL
Status: DISCONTINUED | OUTPATIENT
Start: 2024-06-15 | End: 2024-06-16 | Stop reason: HOSPADM

## 2024-06-15 RX ADMIN — OXYTOCIN 125 ML/HR: 10 INJECTION, SOLUTION INTRAMUSCULAR; INTRAVENOUS at 17:24

## 2024-06-15 RX ADMIN — OXYTOCIN 20 UNITS: 10 INJECTION, SOLUTION INTRAMUSCULAR; INTRAVENOUS at 14:32

## 2024-06-15 RX ADMIN — ROPIVACAINE HYDROCHLORIDE 200 MG: 2 INJECTION, SOLUTION EPIDURAL; INFILTRATION at 11:12

## 2024-06-15 RX ADMIN — OXYTOCIN 125 ML/HR: 10 INJECTION, SOLUTION INTRAMUSCULAR; INTRAVENOUS at 14:52

## 2024-06-15 RX ADMIN — BUPIVACAINE HYDROCHLORIDE 5 ML: 2.5 INJECTION, SOLUTION EPIDURAL; INFILTRATION; INTRACAUDAL at 11:08

## 2024-06-15 RX ADMIN — ROPIVACAINE HYDROCHLORIDE 200 MG: 2 INJECTION, SOLUTION EPIDURAL; INFILTRATION; PERINEURAL at 11:12

## 2024-06-15 RX ADMIN — LIDOCAINE HYDROCHLORIDE,EPINEPHRINE BITARTRATE 3 ML: 15; .005 INJECTION, SOLUTION EPIDURAL; INFILTRATION; INTRACAUDAL; PERINEURAL at 11:08

## 2024-06-15 RX ADMIN — ACETAMINOPHEN 1000 MG: 500 TABLET, FILM COATED ORAL at 20:06

## 2024-06-15 RX ADMIN — SODIUM CHLORIDE, POTASSIUM CHLORIDE, SODIUM LACTATE AND CALCIUM CHLORIDE 1000 ML: 600; 310; 30; 20 INJECTION, SOLUTION INTRAVENOUS at 10:55

## 2024-06-15 RX ADMIN — IBUPROFEN 800 MG: 800 TABLET, FILM COATED ORAL at 14:32

## 2024-06-15 RX ADMIN — SODIUM CHLORIDE, POTASSIUM CHLORIDE, SODIUM LACTATE AND CALCIUM CHLORIDE: 600; 310; 30; 20 INJECTION, SOLUTION INTRAVENOUS at 11:16

## 2024-06-15 ASSESSMENT — LIFESTYLE VARIABLES
TOTAL SCORE: 0
DOES PATIENT WANT TO STOP DRINKING: NO
CONSUMPTION TOTAL: NEGATIVE
HAVE YOU EVER FELT YOU SHOULD CUT DOWN ON YOUR DRINKING: NO
HAVE PEOPLE ANNOYED YOU BY CRITICIZING YOUR DRINKING: NO
ON A TYPICAL DAY WHEN YOU DRINK ALCOHOL HOW MANY DRINKS DO YOU HAVE: 0
ALCOHOL_USE: NO
EVER HAD A DRINK FIRST THING IN THE MORNING TO STEADY YOUR NERVES TO GET RID OF A HANGOVER: NO
HOW MANY TIMES IN THE PAST YEAR HAVE YOU HAD 5 OR MORE DRINKS IN A DAY: 0
EVER FELT BAD OR GUILTY ABOUT YOUR DRINKING: NO
TOTAL SCORE: 0
EVER_SMOKED: NEVER
TOTAL SCORE: 0
AVERAGE NUMBER OF DAYS PER WEEK YOU HAVE A DRINK CONTAINING ALCOHOL: 0

## 2024-06-15 ASSESSMENT — PATIENT HEALTH QUESTIONNAIRE - PHQ9
2. FEELING DOWN, DEPRESSED, IRRITABLE, OR HOPELESS: NOT AT ALL
1. LITTLE INTEREST OR PLEASURE IN DOING THINGS: NOT AT ALL
SUM OF ALL RESPONSES TO PHQ9 QUESTIONS 1 AND 2: 0

## 2024-06-15 ASSESSMENT — PAIN SCALES - GENERAL: PAIN_LEVEL: 0

## 2024-06-15 ASSESSMENT — PAIN DESCRIPTION - PAIN TYPE
TYPE: ACUTE PAIN
TYPE: ACUTE PAIN

## 2024-06-15 NOTE — L&D DELIVERY NOTE
DATE OF SERVICE:  06/15/2024     This is a 24-year-old  3, para 2 at 40 and 5/7th weeks who presented to   labor and delivery this morning with complaints of contractions.  She was   found to be 6 cm dilated and was admitted to labor and delivery.  She received   an epidural for pain management and underwent artificial rupture of membranes   for clear fluid.  Throughout labor, fetal heart tones were in the 140s and a   category 1 tracing.  She progressed to complete and pushed for approximately   10 minutes to deliver the head over an intact perineum.  Mouth and nose were   bulb suctioned and there was no evidence of a nuchal cord.  The rest of baby   delivered easily from the occiput anterior position.  This is a viable female   infant, weight 7 pounds 15 ounces, Apgars 8 and 9, delivered at 1330 hours.  The infant   was placed on the mother's abdomen crying vigorously.  There was delayed cord   clamping for 1 minute.  The cord was then clamped twice and cut.  The placenta   delivered spontaneously intact with a 3-vessel cord at 1333 hours.  20 units   of IV Pitocin, fundal massage and bimanual massage provided good uterine   contraction.  Estimated blood loss was 100 mL.  Inspection of the cervix   revealed no lacerations.  Inspection of the perineum revealed a small perineal   separation.  This was reapproximated using 3-0 Vicryl in the usual sterile   fashion.  Mother and infant are stable.        ______________________________  MD CAMELIA WEBB/AUTUMN/CHA    DD:  06/15/2024 13:52  DT:  06/15/2024 14:12    Job#:  [id number]    CC:RHINA GRAMAJO MD

## 2024-06-15 NOTE — ANESTHESIA TIME REPORT
Anesthesia Start and Stop Event Times       Date Time Event    6/15/2024 1058 Ready for Procedure     1058 Anesthesia Start     1330 Anesthesia Stop          Responsible Staff  06/15/24      Name Role Begin End    Josh Mckeon M.D. Anesth 1058 1330          Overtime Reason:  no overtime (within assigned shift)    Comments:

## 2024-06-15 NOTE — CARE PLAN
Problem: Knowledge Deficit - L&D  Goal: Patient and family/caregivers will demonstrate understanding of plan of care, disease process/condition, diagnostic tests and medications  Outcome: Progressing     Problem: Pain  Goal: Patient's pain will be alleviated or reduced to the patient’s comfort goal  Outcome: Progressing   The patient is Stable - Low risk of patient condition declining or worsening    Shift Goals  Clinical Goals: Healthy Mom and Healthy Baby  Patient Goals: Have a Baby  Family Goals: Get an epidural    Progress made toward(s) clinical / shift goals:  Pt POC is for and epidural ASAP and a healthy mom and healthy baby. Will continue to monitor pain q hr. Importance of hand hygiene discussed.

## 2024-06-15 NOTE — ANESTHESIA PREPROCEDURE EVALUATION
Date: 06/15/24  Procedure: Labor Epidural       25 yo F  requesting labor epidural    Relevant Problems   No relevant active problems       Physical Exam    Airway   Mallampati: II  TM distance: >3 FB  Neck ROM: full       Cardiovascular - normal exam  Rhythm: regular  Rate: normal  (-) murmur     Dental - normal exam           Pulmonary - normal exam  Breath sounds clear to auscultation     Abdominal    Neurological - normal exam                   Anesthesia Plan    ASA 2       Plan - epidural   Neuraxial block will be labor analgesia                  Pertinent diagnostic labs and testing reviewed    Informed Consent:    Anesthetic plan and risks discussed with patient.

## 2024-06-15 NOTE — ANESTHESIA PROCEDURE NOTES
Epidural Block    Date/Time: 6/15/2024 10:58 AM    Performed by: Josh Mckeon M.D.  Authorized by: Josh Mckeon M.D.    Patient Location:  OB  Start Time:  6/15/2024 10:58 AM  End Time:  6/15/2024 11:08 AM  Reason for Block: labor analgesia    patient identified, IV checked, site marked, risks and benefits discussed, surgical consent, monitors and equipment checked and pre-op evaluation    Patient Position:  Sitting  Prep: ChloraPrep, patient draped and sterile technique    Monitoring:  Blood pressure, continuous pulse oximetry and heart rate  Approach:  Midline  Location:  L2-L3  Injection Technique:  ERAN saline  Skin infiltration:  Lidocaine  Strength:  1%  Dose:  3ml  Needle Type:  Tuohy  Needle Gauge:  17 G  Needle Length:  3.5 in  Loss of resistance::  5  Catheter Size:  19 G  Catheter at Skin Depth:  11  Test Dose Result:  Negative

## 2024-06-15 NOTE — H&P
DATE OF ADMISSION:  06/15/2024     ADMITTING DIAGNOSES:    1.  Intrauterine pregnancy at 40 and 5/7th weeks.  2.  Labor.  3.  Postdates.     HISTORY OF PRESENT ILLNESS:  This is a 24-year-old  3, para 2 with an   estimated date of confinement of 06/10/2024 established by last menstrual   period consistent with 11-week ultrasound, who presents to labor and delivery   at 40 and 5/7th weeks' with complaints of contractions since 0600 hours this   morning.  She was evaluated on labor and delivery and found to be 6-7 cm   dilated, 80% effaced, -2 station, vertex presentation.  The recommendation is   made for admission.  The patient agrees and is requesting an epidural.     Prenatal care has been with Dr. West.  Her estimated date of confinement   of 06/10/2024 was established by last menstrual period consistent with 11-week   ultrasound.     PRENATAL LABS:  Her blood type is A positive, antibody screen negative, RPR   nonreactive, rubella immune, hepatitis B surface antigen negative, hepatitis C   negative, HIV negative.  Her 1-hour Glucola was normal.  Her group B strep   status is negative.  She had noninvasive  screening performed, which   was low risk.  She declined AFP and recessive gene screen.     Complications with the pregnancy include a total weight gain of 41 pounds.    She has an anterior fundal placenta.     ALLERGIES:  She has no known drug allergies.     MEDICATIONS:  Include prenatal vitamins.     PAST MEDICAL HISTORY:  Negative.     PAST SURGICAL HISTORY:  Significant for left arm fracture repair.     SOCIAL HISTORY:  She denies tobacco, alcohol or IV drug use.     FAMILY HISTORY:  She has no family history of GYN or colon cancer.     GYNECOLOGIC HISTORY:  She has no history of any HSV or abnormal Paps.     OBSTETRICAL HISTORY:   1, was a normal spontaneous vaginal delivery at   term, female infant, weight 6 pounds 5 ounces, no complications.   2   was a normal  spontaneous vaginal delivery of a male infant at term, 7 pounds   12 ounces.  She had a second-degree laceration.  3 is her current   pregnancy.     PHYSICAL EXAMINATION:    VITAL SIGNS:  Temperature is 96.7, pulse is 100, blood pressure is 115/56.  GENERAL:  She is in mild distress with contractions.  ABDOMEN:  Gravid and nontender.  EXTREMITIES:  Showed trace pedal edema.  She has no cords or Homans sign.  PELVIC:  Fetal heart tones are in the 140s and a category 1 tracing.    Tocometry shows contractions every 5 minutes.  Cervical exam per the nurse on   admission, she is 6-7 cm dilated, 80% effaced, -2 station, vertex   presentation, bulging bag of water.  Estimated fetal weight is 3200 grams.     IMPRESSION:  This is a 24-year-old  3, para 2 at 40 and 5/7th weeks in   active labor, who is doing well.     PLAN:    1.  The patient has been admitted to labor and delivery.  2.  IV fluids will be started.  3.  She will receive an epidural.  4.  There is currently reassuring fetal surveillance.        ______________________________  MD CAMELIA WEBB/GUERO    DD:  06/15/2024 11:02  DT:  06/15/2024 11:20    Job#:  [id number]    CC:RHINA GRAMAJO MD

## 2024-06-15 NOTE — ANESTHESIA POSTPROCEDURE EVALUATION
Patient: iMa Thurman    Procedure Summary       Date: 06/15/24 Room / Location:     Anesthesia Start: 1058 Anesthesia Stop: 1330    Procedure: Labor Epidural Diagnosis:     Scheduled Providers:  Responsible Provider: Josh Mckeon M.D.    Anesthesia Type: epidural ASA Status: 2            Final Anesthesia Type: epidural  Last vitals  BP   Blood Pressure: 132/66    Temp   36.6 °C (97.8 °F)    Pulse   95   Resp   16    SpO2   92 %      Anesthesia Post Evaluation    Patient location during evaluation: PACU  Patient participation: complete - patient participated  Level of consciousness: awake  Pain score: 0    Airway patency: patent  Anesthetic complications: no  Cardiovascular status: hemodynamically stable  Respiratory status: acceptable  Hydration status: acceptable    PONV: none          No notable events documented.     Nurse Pain Score: 0 (NPRS)

## 2024-06-16 VITALS
DIASTOLIC BLOOD PRESSURE: 66 MMHG | WEIGHT: 169 LBS | SYSTOLIC BLOOD PRESSURE: 107 MMHG | HEART RATE: 76 BPM | BODY MASS INDEX: 31.1 KG/M2 | HEIGHT: 62 IN | OXYGEN SATURATION: 97 % | RESPIRATION RATE: 17 BRPM | TEMPERATURE: 98.3 F

## 2024-06-16 LAB
ERYTHROCYTE [DISTWIDTH] IN BLOOD BY AUTOMATED COUNT: 45.1 FL (ref 35.9–50)
HCT VFR BLD AUTO: 35.9 % (ref 37–47)
HGB BLD-MCNC: 12.4 G/DL (ref 12–16)
MCH RBC QN AUTO: 31.2 PG (ref 27–33)
MCHC RBC AUTO-ENTMCNC: 34.5 G/DL (ref 32.2–35.5)
MCV RBC AUTO: 90.2 FL (ref 81.4–97.8)
PLATELET # BLD AUTO: 154 K/UL (ref 164–446)
PMV BLD AUTO: 10.8 FL (ref 9–12.9)
RBC # BLD AUTO: 3.98 M/UL (ref 4.2–5.4)
WBC # BLD AUTO: 12.9 K/UL (ref 4.8–10.8)

## 2024-06-16 PROCEDURE — 36415 COLL VENOUS BLD VENIPUNCTURE: CPT

## 2024-06-16 PROCEDURE — 85027 COMPLETE CBC AUTOMATED: CPT

## 2024-06-16 PROCEDURE — 700102 HCHG RX REV CODE 250 W/ 637 OVERRIDE(OP): Performed by: OBSTETRICS & GYNECOLOGY

## 2024-06-16 PROCEDURE — A9270 NON-COVERED ITEM OR SERVICE: HCPCS | Performed by: OBSTETRICS & GYNECOLOGY

## 2024-06-16 RX ADMIN — PRENATAL WITH FERROUS FUM AND FOLIC ACID 1 TABLET: 3080; 920; 120; 400; 22; 1.84; 3; 20; 10; 1; 12; 200; 27; 25; 2 TABLET ORAL at 07:15

## 2024-06-16 RX ADMIN — IBUPROFEN 800 MG: 800 TABLET, FILM COATED ORAL at 01:13

## 2024-06-16 ASSESSMENT — EDINBURGH POSTNATAL DEPRESSION SCALE (EPDS)
I HAVE BEEN ANXIOUS OR WORRIED FOR NO GOOD REASON: NO, NOT AT ALL
THE THOUGHT OF HARMING MYSELF HAS OCCURRED TO ME: NEVER
I HAVE BEEN SO UNHAPPY THAT I HAVE BEEN CRYING: NO, NEVER
I HAVE BEEN SO UNHAPPY THAT I HAVE HAD DIFFICULTY SLEEPING: NOT AT ALL
I HAVE FELT SAD OR MISERABLE: NO, NOT AT ALL
THINGS HAVE BEEN GETTING ON TOP OF ME: NO, I HAVE BEEN COPING AS WELL AS EVER
I HAVE BEEN ABLE TO LAUGH AND SEE THE FUNNY SIDE OF THINGS: AS MUCH AS I ALWAYS COULD
I HAVE LOOKED FORWARD WITH ENJOYMENT TO THINGS: AS MUCH AS I EVER DID
I HAVE BLAMED MYSELF UNNECESSARILY WHEN THINGS WENT WRONG: NO, NEVER
I HAVE FELT SCARED OR PANICKY FOR NO GOOD REASON: NO, NOT AT ALL

## 2024-06-16 NOTE — CARE PLAN
The patient is Stable - Low risk of patient condition declining or worsening    Shift Goals  Clinical Goals: Pain control, lochia remain WDL, VSS    Progress made toward(s) clinical / shift goals: Patient pain well controlled with rest, repositioning, and medication as needed. Ambulating, voiding, and tolerating PO well at this time. Fundus firm and palpable, lochia light rubra. No s/sx of infection observed during assessment.      Problem: Knowledge Deficit - Standard  Goal: Patient and family/care givers will demonstrate understanding of plan of care, disease process/condition, diagnostic tests and medications  Outcome: Progressing     Problem: Knowledge Deficit - Postpartum  Goal: Patient will verbalize and demonstrate understanding of self and infant care  Outcome: Progressing     Problem: Psychosocial - Postpartum  Goal: Patient will verbalize and demonstrate effective bonding and parenting behavior  Outcome: Progressing     Problem: Altered Physiologic Condition  Goal: Patient physiologically stable as evidenced by normal lochia, palpable uterine involution and vitals within normal limits  Outcome: Progressing       Patient is not progressing towards the following goals:

## 2024-06-16 NOTE — LACTATION NOTE
Mia is 24 year old  who delivered vaginally on 6/15 at 1330. Her daughter, Apurva, was born at 40+5 weeks and weighed 7 lbs. 15.2 ounces.  Apurva's weight today is 7 lbs. 11.3 ounces.     Mia feels baby is latching well, eagerly, and Mia denies discomfort during feedings. Mia pumped for her first baby due to a difficult latch and was able to breastfeed her second baby.     We discussed going back to work and when to begin preparation by pumping and storing milk.  Questions answered regarding appropriate flange fit.     Requested parent's call for latch assistance at this next feeding and prn with questions or concerns. Parents are planning for discharge today.

## 2024-06-16 NOTE — DISCHARGE INSTRUCTIONS

## 2024-06-16 NOTE — CARE PLAN
The patient is Watcher - Medium risk of patient condition declining or worsening    Shift Goals  Clinical Goals: pain control, lochia WDL, VSS  Patient Goals: healthy baby  Family Goals: bond with baby    Progress made toward(s) clinical / shift goals:  pain well controlled, VSS, POC discussed    Patient is not progressing towards the following goals:

## 2024-06-16 NOTE — CARE PLAN
The patient is Stable - Low risk of patient condition declining or worsening    Shift Goals  Clinical Goals: Pain control, lochia remain WDL, VSS    Progress made toward(s) clinical / shift goals:  Patient VSS and Wdl at time of assessment. Fundus firm and palpable, lochia scant to light rubra. Patient breastfeeding and able to latch infant independently at this time. No s/sx of infection observed.     Problem: Knowledge Deficit - Standard  Goal: Patient and family/care givers will demonstrate understanding of plan of care, disease process/condition, diagnostic tests and medications  Outcome: Progressing     Problem: Knowledge Deficit - Postpartum  Goal: Patient will verbalize and demonstrate understanding of self and infant care  Outcome: Progressing     Problem: Psychosocial - Postpartum  Goal: Patient will verbalize and demonstrate effective bonding and parenting behavior  Outcome: Progressing       Patient is not progressing towards the following goals:

## 2024-08-23 ENCOUNTER — APPOINTMENT (OUTPATIENT)
Dept: RADIOLOGY | Facility: MEDICAL CENTER | Age: 24
End: 2024-08-23
Attending: EMERGENCY MEDICINE
Payer: COMMERCIAL

## 2024-08-23 ENCOUNTER — HOSPITAL ENCOUNTER (EMERGENCY)
Facility: MEDICAL CENTER | Age: 24
End: 2024-08-23
Attending: EMERGENCY MEDICINE
Payer: COMMERCIAL

## 2024-08-23 VITALS
DIASTOLIC BLOOD PRESSURE: 56 MMHG | TEMPERATURE: 97.9 F | HEART RATE: 80 BPM | RESPIRATION RATE: 16 BRPM | BODY MASS INDEX: 27.54 KG/M2 | OXYGEN SATURATION: 96 % | WEIGHT: 150.57 LBS | SYSTOLIC BLOOD PRESSURE: 108 MMHG
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
## 2024-08-23 DIAGNOSIS — S16.1XXA STRAIN OF NECK MUSCLE, INITIAL ENCOUNTER: ICD-10-CM

## 2024-08-23 PROCEDURE — 99284 EMERGENCY DEPT VISIT MOD MDM: CPT

## 2024-08-23 PROCEDURE — 72040 X-RAY EXAM NECK SPINE 2-3 VW: CPT

## 2024-08-23 RX ORDER — NAPROXEN 500 MG/1
500 TABLET ORAL 2 TIMES DAILY WITH MEALS
Qty: 60 TABLET | Refills: 0 | Status: SHIPPED | OUTPATIENT
Start: 2024-08-23

## 2024-08-23 RX ORDER — IBUPROFEN 600 MG/1
600 TABLET, FILM COATED ORAL ONCE
Status: DISCONTINUED | OUTPATIENT
Start: 2024-08-23 | End: 2024-08-23 | Stop reason: HOSPADM

## 2024-08-23 ASSESSMENT — PAIN DESCRIPTION - PAIN TYPE: TYPE: ACUTE PAIN
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
## 2024-08-23 NOTE — ED NOTES
PT ambulated to the room without assistance.  PT placed into bed in a position of comfort, bed rails in place X2, call bell within reach.

## 2024-08-24 NOTE — ED NOTES
Pt discharged. Pt in possession of belongings and child. Pt provided discharge education and information pertaining to medications and follow up appointments. Pt received copy of discharge instructions and verbalized understanding. /56   Pulse 80   Temp 36.6 °C (97.9 °F) (Temporal)   Resp 16   Wt 68.3 kg (150 lb 9.2 oz)   SpO2 96%   BMI 27.54 kg/m²

## 2024-11-08 NOTE — PROGRESS NOTES
0700- Received report from MADELAINE Wyatt. Assumed care. 12 hour chart check, MAR and orders reviewed.      0715- Assessment complete. Fundus firm and palpable, lochia scant to light rubra. Pain management and interventions discussed with pt. POC discussed. All questions and concerns discussed. No further concerns.  
Discharge paperwork for infant and mom discussed at bedside. All questions answered. Follow-up appointments reviewed. Parents aware of NBS #2 and dates to complete it by. Paperwork signed and dated at this time.    1415- Patient and infant discharged with escort off unit. Infant discharged in car seat, patient in wheelchair. Infant placed in car seat by parents and checked by RN. Cuddles removed. Bands verified. All questions answered at this time.  
PP day #1    S:  Pt doing well.  Minimal lochia. Breast feeding.  No problems voiding. Ready to go home.  OTC Motrin for pain    O: T 98.3 P 76  /66  ABD: Soft, NT, FF below umb  EXT: No cords or Homans  H/H   A+  GBS neg    A/P;  PP day #1 S/P  at term - doing well   D/C home  F/U Dr. West 6 weeks  Pelvic rest for 6 weeks  OTC Motrin and PNV  
Patient transferred from labor and delivery in wheelchair with infant in arms and FOB accompanying with belongings. Two RN verification of infant and parent armbands. Report received from CHANO Francois RN. Patient oriented to unit and POC including, EPDS, BC, I&O chart, feeding frequencies, safe sleeping practices, and call light cord use. Assessment completed, fundus firm and palpable, lochia scant to light rubra. Patient right leg still significantly numb from epidural and catheter was removed during delivery. Patient aware to void by 1930 and to call when ready to get up to restroom. Pitocin infusing at 125 ml/hr. IV patent, no s/s of infiltration at insertion site. Patient encouraged to call with needs.    
Pt is a ; TERRIE of , making her 40.5. Pt here reporting painful Ucs q6 min apart, denies LOF, VB and reports +FM. EFM and TOCO applied, VSS, SVE at 6/80/-2 with BBOW. Pt requesting epidural, Dr Zamarripa notified, IV stared, labs drawn, admission profile completed.     Dr Mckeon called to bedside, epidural placed with no complications, pt resting comfortable, bolus button pressed as pt reported pain on right side only.      After reposition and bolus button pt comfortable. Dr Zamarripa at bedside. SVE at RIm with AROM, clear fluid. POC is to recheck in 30 minutes.     SVE at Complete, test push attempted and MD called for delivery.  at 1330 with viable female, 8/9 apgars, and no complications.     Pt remained firm/ light-mod, at 2 below U, ambulated to BR with 2 person assist. Pt left leg is still fairly numb. Was able to shufle to the wheel chair with assist and was transferred to PPU. BS report given to Leta BURKETT.   
S:  Pt requested and received epidural.  Discussed AROM.  Pt agrees    O: VSS - afebrile  FHTs 140s CAt 1  [place] Q 2  Cx: Rim/100/-1 AROM clear    A/P:  IUP at 40 5/7 weeks, active labor - doing well   Labor:  Progressing well.  Recheck in 30 minutes and begin second stage if complete  FWB: reassuring   
No

## 2024-12-01 ENCOUNTER — APPOINTMENT (OUTPATIENT)
Dept: URGENT CARE | Facility: PHYSICIAN GROUP | Age: 24
End: 2024-12-01
Payer: COMMERCIAL

## 2024-12-01 ENCOUNTER — OFFICE VISIT (OUTPATIENT)
Dept: URGENT CARE | Facility: PHYSICIAN GROUP | Age: 24
End: 2024-12-01
Payer: COMMERCIAL

## 2024-12-01 VITALS
HEIGHT: 62 IN | HEART RATE: 72 BPM | DIASTOLIC BLOOD PRESSURE: 72 MMHG | RESPIRATION RATE: 16 BRPM | SYSTOLIC BLOOD PRESSURE: 106 MMHG | TEMPERATURE: 96.8 F | BODY MASS INDEX: 24.26 KG/M2 | WEIGHT: 131.84 LBS | OXYGEN SATURATION: 97 %

## 2024-12-01 DIAGNOSIS — H66.91 ACUTE RIGHT OTITIS MEDIA: ICD-10-CM

## 2024-12-01 DIAGNOSIS — H60.311 ACUTE DIFFUSE OTITIS EXTERNA OF RIGHT EAR: ICD-10-CM

## 2024-12-01 PROCEDURE — 3074F SYST BP LT 130 MM HG: CPT | Performed by: FAMILY MEDICINE

## 2024-12-01 PROCEDURE — 99203 OFFICE O/P NEW LOW 30 MIN: CPT | Performed by: FAMILY MEDICINE

## 2024-12-01 PROCEDURE — 3078F DIAST BP <80 MM HG: CPT | Performed by: FAMILY MEDICINE

## 2024-12-01 RX ORDER — NEOMYCIN SULFATE, POLYMYXIN B SULFATE AND HYDROCORTISONE 10; 3.5; 1 MG/ML; MG/ML; [USP'U]/ML
3 SUSPENSION/ DROPS AURICULAR (OTIC) 3 TIMES DAILY
Qty: 5 ML | Refills: 0 | Status: SHIPPED | OUTPATIENT
Start: 2024-12-01 | End: 2024-12-06

## 2024-12-01 RX ORDER — AMOXICILLIN 875 MG/1
875 TABLET, COATED ORAL 2 TIMES DAILY
Qty: 14 TABLET | Refills: 0 | Status: SHIPPED | OUTPATIENT
Start: 2024-12-01 | End: 2024-12-08

## 2024-12-01 ASSESSMENT — ENCOUNTER SYMPTOMS
DIZZINESS: 0
FEVER: 0
SORE THROAT: 1
COUGH: 0
VOMITING: 0
CHILLS: 0
MYALGIAS: 0
SWOLLEN GLANDS: 1
NAUSEA: 0
SHORTNESS OF BREATH: 0

## 2024-12-01 NOTE — LETTER
December 1, 2024         Patient: Mia Thurman   YOB: 2000   Date of Visit: 12/1/2024           To Whom it May Concern:    Mia Thurman was seen in my clinic on 12/1/2024. She may return to work on 12/3/2024.    If you have any questions or concerns, please don't hesitate to call.        Sincerely,           Aries Mireles M.D.  Electronically Signed

## 2024-12-02 NOTE — PROGRESS NOTES
Subjective:   Mia Thurman is a 24 y.o. female who presents for Pharyngitis (Sore throat and R ear pain x 1 day. Cannot hear out of R ear, no history of ear infections, some hx of strep.)        Pharyngitis   This is a new (Reports right-sided ear pain, intermittent sensation of ear plugging over the past 3 days, sore throat over the past week) problem. The current episode started in the past 7 days. The problem has been waxing and waning. Associated symptoms include ear pain and swollen glands. Pertinent negatives include no congestion, coughing, ear discharge, shortness of breath or vomiting.     PMH:  has no past medical history of Addisons disease ([facility]), Adrenal disorder ([facility]), Allergy, Anemia, Anxiety, Arrhythmia, Arthritis, Asthma, Cancer ([facility]), Cataract, CHF (congestive heart failure) ([facility]), Clotting disorder ([facility]), COPD (chronic obstructive pulmonary disease) ([facility]), Cushings syndrome ([facility]), Depression, Diabetes ([facility]), Diabetic neuropathy ([facility]), GERD (gastroesophageal reflux disease), Glaucoma, Goiter, Head ache, Heart attack ([facility]), Heart murmur, HIV (human immunodeficiency virus infection) ([facility]), Hyperlipidemia, Hypertension, IBD (inflammatory bowel disease), Kidney disease, Meningitis, Migraine, Muscle disorder, Osteoporosis, Parathyroid disorder ([facility]), Pituitary disease ([facility]), Pulmonary emphysema ([facility]), Seizure ([facility]), Sickle cell disease ([facility]), Stroke ([facility]), Substance abuse ([facility]), Thyroid disease, Tuberculosis, or Urinary tract infection.  MEDS:   Current Outpatient Medications:     amoxicillin (AMOXIL) 875 MG tablet, Take 1 Tablet by mouth 2 times a day for 7 days. Take twice a day for 7 days, Disp: 14 Tablet, Rfl: 0    neomycin-polymyxin-HC (PEDIOTIC HC) 3.5-31779-3 Suspension, Administer 3 Drops into affected ear(s) 3 times a day for 5 days., Disp: 5 mL, Rfl: 0    Prenatal MV-Min-Fe Fum-FA-DHA (PRENATAL 1 PO), Take  by mouth., Disp: , Rfl:     naproxen (NAPROSYN) 500 MG Tab, Take 1 Tablet by mouth 2  "times a day with meals. (Patient not taking: Reported on 12/1/2024), Disp: 60 Tablet, Rfl: 0  ALLERGIES: No Known Allergies  SURGHX:   Past Surgical History:   Procedure Laterality Date    ESOPHAGOSCOPY  12/12/2008    Performed by BILLY JIMÉNEZ at [facility]    OTHER  2002    left arm surgery      SOCHX:  reports that she has never smoked. She has never used smokeless tobacco. She reports current alcohol use. She reports that she does not use drugs.  FH:   Family History   Problem Relation Age of Onset    No Known Problems Mother     No Known Problems Father     Cancer Maternal Grandmother         stomach    Diabetes Maternal Grandmother     Hypertension Maternal Grandmother     Diabetes Maternal Grandfather     Hypertension Maternal Grandfather     Diabetes Paternal Grandmother     Hypertension Paternal Grandmother     Diabetes Paternal Grandfather     Hypertension Paternal Grandfather      Review of Systems   Constitutional:  Negative for chills and fever.   HENT:  Positive for ear pain and sore throat. Negative for congestion and ear discharge.    Respiratory:  Negative for cough and shortness of breath.    Gastrointestinal:  Negative for nausea and vomiting.   Genitourinary:  Negative for dysuria.   Musculoskeletal:  Negative for myalgias.   Skin:  Negative for rash.   Neurological:  Negative for dizziness.        Objective:   /72 (BP Location: Left arm, Patient Position: Sitting, BP Cuff Size: Adult long)   Pulse 72   Temp 36 °C (96.8 °F) (Temporal)   Resp 16   Ht 1.575 m (5' 2\")   Wt 59.8 kg (131 lb 13.4 oz)   SpO2 97%   BMI 24.11 kg/m²   Physical Exam  Vitals and nursing note reviewed.   Constitutional:       General: She is not in acute distress.     Appearance: She is well-developed.   HENT:      Head: Normocephalic and atraumatic.      Right Ear: External ear normal. Swelling and tenderness present. Tympanic membrane is erythematous and bulging.      Left Ear: External ear " normal. No swelling or tenderness. Tympanic membrane is not erythematous or bulging.      Ears:      Comments: Right ear canal edematous, erythematous     Nose: Rhinorrhea present.      Mouth/Throat:      Mouth: Mucous membranes are moist.      Pharynx: Posterior oropharyngeal erythema present. No oropharyngeal exudate.   Eyes:      Conjunctiva/sclera: Conjunctivae normal.   Cardiovascular:      Rate and Rhythm: Normal rate.   Pulmonary:      Effort: Pulmonary effort is normal. No respiratory distress.      Breath sounds: Normal breath sounds. No wheezing or rhonchi.   Abdominal:      General: There is no distension.   Musculoskeletal:         General: Normal range of motion.   Skin:     General: Skin is warm and dry.   Neurological:      General: No focal deficit present.      Mental Status: She is alert and oriented to person, place, and time. Mental status is at baseline.      Gait: Gait (gait at baseline) normal.   Psychiatric:         Judgment: Judgment normal.           Assessment/Plan:   1. Acute right otitis media  - amoxicillin (AMOXIL) 875 MG tablet; Take 1 Tablet by mouth 2 times a day for 7 days. Take twice a day for 7 days  Dispense: 14 Tablet; Refill: 0    2. Acute diffuse otitis externa of right ear  - neomycin-polymyxin-HC (PEDIOTIC HC) 3.5-21206-4 Suspension; Administer 3 Drops into affected ear(s) 3 times a day for 5 days.  Dispense: 5 mL; Refill: 0        Medical Decision Making/Course:  In the course of preparing for this visit with review of the pertinent past medical history, recent and past clinic visits, current medications, and performing chart, immunization, medical history and medication reconciliation, and in the further course of obtaining the current history pertinent to the clinic visit today, performing an exam and evaluation, ordering and independently evaluating labs, tests  , and/or procedures, prescribing any recommended new medications as noted above, providing any pertinent  counseling and education and recommending further coordination of care including recommendations for symptomatic and supportive measures and drafting work excuse letter, at least  15 minutes of total time were spent during this encounter.      Discussed close monitoring, return precautions, and supportive measures of maintaining adequate fluid hydration and caloric intake, relative rest and symptom management as needed for pain and/or fever.    Differential diagnosis, natural history, supportive care, and indications for immediate follow-up discussed.     Advised the patient to follow-up with the primary care physician for recheck, reevaluation, and consideration of further management.    Please note that this dictation was created using voice recognition software. I have worked with consultants from the vendor as well as technical experts from [employer] to optimize the interface. I have made every reasonable attempt to correct obvious errors, but I expect that there are errors of grammar and possibly content that I did not discover before finalizing the note.